# Patient Record
Sex: MALE | Race: WHITE | NOT HISPANIC OR LATINO | Employment: FULL TIME | URBAN - NONMETROPOLITAN AREA
[De-identification: names, ages, dates, MRNs, and addresses within clinical notes are randomized per-mention and may not be internally consistent; named-entity substitution may affect disease eponyms.]

---

## 2017-07-13 ENCOUNTER — HISTORY (OUTPATIENT)
Dept: FAMILY MEDICINE | Facility: OTHER | Age: 57
End: 2017-07-13

## 2017-07-13 ENCOUNTER — OFFICE VISIT - GICH (OUTPATIENT)
Dept: FAMILY MEDICINE | Facility: OTHER | Age: 57
End: 2017-07-13

## 2017-07-13 DIAGNOSIS — F52.8 OTHER SEXUAL DYSFUNCTION NOT DUE TO A SUBSTANCE OR KNOWN PHYSIOLOGICAL CONDITION: ICD-10-CM

## 2017-07-13 DIAGNOSIS — Z00.00 ENCOUNTER FOR GENERAL ADULT MEDICAL EXAMINATION WITHOUT ABNORMAL FINDINGS: ICD-10-CM

## 2017-07-13 DIAGNOSIS — Z98.890 OTHER SPECIFIED POSTPROCEDURAL STATES: ICD-10-CM

## 2017-07-13 DIAGNOSIS — F17.200 NICOTINE DEPENDENCE, UNCOMPLICATED: ICD-10-CM

## 2017-07-13 DIAGNOSIS — F41.1 GENERALIZED ANXIETY DISORDER: ICD-10-CM

## 2017-07-13 ASSESSMENT — ANXIETY QUESTIONNAIRES
6. BECOMING EASILY ANNOYED OR IRRITABLE: NOT AT ALL
3. WORRYING TOO MUCH ABOUT DIFFERENT THINGS: NOT AT ALL
GAD7 TOTAL SCORE: 0
5. BEING SO RESTLESS THAT IT IS HARD TO SIT STILL: NOT AT ALL
2. NOT BEING ABLE TO STOP OR CONTROL WORRYING: NOT AT ALL
4. TROUBLE RELAXING: NOT AT ALL
7. FEELING AFRAID AS IF SOMETHING AWFUL MIGHT HAPPEN: NOT AT ALL
1. FEELING NERVOUS, ANXIOUS, OR ON EDGE: NOT AT ALL

## 2017-07-13 ASSESSMENT — PATIENT HEALTH QUESTIONNAIRE - PHQ9: SUM OF ALL RESPONSES TO PHQ QUESTIONS 1-9: 0

## 2017-12-27 NOTE — PROGRESS NOTES
"Patient Information     Patient Name MRN Sex Td Jeronimo [id number] Male 1960      Progress Notes by David Carballo MD at 2017  3:00 PM     Author:  David Carballo MD Service:  (none) Author Type:  Physician     Filed:  2017  3:47 PM Encounter Date:  2017 Status:  Signed     :  David Carballo MD (Physician)            SUBJECTIVE:    Td Leonardo is a 56 y.o. male who presents for physical    HPI Comments: Patient arrives here for physical and medication refill. Currently does not have any complaints or concerns. States the Paxil is working well. Recently increased the dose.      No Known Allergies,   Family History       Problem   Relation Age of Onset     Psychiatric illness  Father      Dementia       Good Health  Mother    ,   No current outpatient prescriptions on file prior to visit.     No current facility-administered medications on file prior to visit.    ,   Past Surgical History:      Procedure  Laterality Date     TONSILLECTOMY       VASECTOMY     ,   Social History       Substance Use Topics         Smoking status:   Current Every Day Smoker     Smokeless tobacco:   Never Used     Alcohol use   Yes      Comment: less than weekly      and   Social History       Substance Use Topics         Smoking status:   Current Every Day Smoker     Smokeless tobacco:   Never Used     Alcohol use   Yes      Comment: less than weekly         REVIEW OF SYSTEMS:  ROS    OBJECTIVE:  /66  Pulse 72  Ht 1.676 m (5' 6\")  Wt 71.8 kg (158 lb 6.4 oz)  BMI 25.57 kg/m2    EXAM:   Physical Exam   Constitutional: He is oriented to person, place, and time and well-developed, well-nourished, and in no distress.   HENT:   Right Ear: External ear normal.   Left Ear: External ear normal.   Mouth/Throat: No oropharyngeal exudate.   Poor dentition   Cardiovascular: Normal rate, regular rhythm and normal heart sounds.    Pulmonary/Chest: Effort normal and breath sounds normal. "   Abdominal: Soft.   Genitourinary:   Genitourinary Comments: Declines prostate exam   Musculoskeletal: Normal range of motion.   Neurological: He is alert and oriented to person, place, and time.   Psychiatric: Affect normal.       ASSESSMENT/PLAN:    ICD-10-CM    1. Physical exam Z00.00 LIPID PANEL   2. ERECTILE DYSFUNCTION F52.8 tadalafil (CIALIS) 20 mg tablet      sildenafil (REVATIO) 20 mg tablet   3. H/O colonoscopy 2014 nl Z98.890    4. ANXIETY F41.1 PARoxetine (PAXIL) 30 mg tablet   5. TOBACCO USE F17.200         Plan:  The natural history of prostate cancer and ongoing controversy regarding screening and potential treatment outcomes of prostate cancer has been discussed with the patient. The meaning of a false positive PSA and a false negative PSA has been discussed. He indicates understanding of the limitations of this screening test and wishes not to proceed with screening PSA testing.  We'll proceed with a fasting lipid panel.  Also discussed trying Revatio. He buys his own impotence medication. If he would like to go back on Cialis will prescribe that.  Encouraged tobacco sensation.  Up-to-date on his colonoscopy.

## 2017-12-30 NOTE — NURSING NOTE
Patient Information     Patient Name MRN Td Bland [id number] Male 1960      Nursing Note by Maricel Giang at 2017  3:00 PM     Author:  Maricel Giang Service:  (none) Author Type:  (none)     Filed:  2017  3:08 PM Encounter Date:  2017 Status:  Signed     :  Maricel Giang            Patient here for yearly physical, medication refills. Last dental exam 1 year ago and last eye exam 3 months prior. No concerns today. Maricel Giang LPN .......................2017  2:54 PM

## 2018-01-26 VITALS
WEIGHT: 158.4 LBS | BODY MASS INDEX: 25.46 KG/M2 | DIASTOLIC BLOOD PRESSURE: 66 MMHG | HEART RATE: 72 BPM | SYSTOLIC BLOOD PRESSURE: 110 MMHG | HEIGHT: 66 IN

## 2018-01-30 ENCOUNTER — DOCUMENTATION ONLY (OUTPATIENT)
Dept: FAMILY MEDICINE | Facility: OTHER | Age: 58
End: 2018-01-30

## 2018-01-30 PROBLEM — Z00.00 PHYSICAL EXAM: Status: ACTIVE | Noted: 2017-07-13

## 2018-01-30 PROBLEM — F52.8 PSYCHOSEXUAL DYSFUNCTION WITH INHIBITED SEXUAL EXCITEMENT: Status: ACTIVE | Noted: 2018-01-30

## 2018-01-30 PROBLEM — Z98.890 H/O COLONOSCOPY: Status: ACTIVE | Noted: 2017-07-13

## 2018-01-30 PROBLEM — F41.1 ANXIETY STATE: Status: ACTIVE | Noted: 2018-01-30

## 2018-01-30 RX ORDER — SILDENAFIL CITRATE 20 MG/1
TABLET ORAL
COMMUNITY
Start: 2017-07-13 | End: 2021-03-03

## 2018-01-30 RX ORDER — PAROXETINE 30 MG/1
30 TABLET, FILM COATED ORAL EVERY MORNING
COMMUNITY
Start: 2017-07-13 | End: 2018-07-05

## 2018-01-30 RX ORDER — TADALAFIL 20 MG/1
TABLET ORAL
COMMUNITY
Start: 2017-07-13 | End: 2019-01-24

## 2018-02-03 ASSESSMENT — PATIENT HEALTH QUESTIONNAIRE - PHQ9: SUM OF ALL RESPONSES TO PHQ QUESTIONS 1-9: 0

## 2018-02-03 ASSESSMENT — ANXIETY QUESTIONNAIRES: GAD7 TOTAL SCORE: 0

## 2018-07-05 DIAGNOSIS — F41.1 ANXIETY STATE: Primary | ICD-10-CM

## 2018-07-05 NOTE — LETTER
July 6, 2018      Td Leonardo  [address] [place] [address]        Dear Td,     This letter is to remind you that you are due for your annual exam with David Carballo. Your last comprehensive visit was more than 12 months ago.    A LIMITED refill of Paroxetine (Paxil) has been called into your pharmacy. Additional refills require you to complete this appointment.    Please call the clinic at [phone] to schedule your appointment.    If you should require additional refills before your scheduled appointment, please contact your pharmacy and we will refill your medication until the date of your appointment.    If you are no longer seeing David Carballo for primary care, please call to let us know. Doing so will remove you from our call/contact list.      Thank you for choosing [facility] and [facility] for your health care needs.    Sincerely,    Refill RN  [facility]

## 2018-07-06 RX ORDER — PAROXETINE 30 MG/1
30 TABLET, FILM COATED ORAL EVERY MORNING
Qty: 90 TABLET | Refills: 0 | Status: SHIPPED | OUTPATIENT
Start: 2018-07-06 | End: 2019-01-24

## 2018-07-06 NOTE — TELEPHONE ENCOUNTER
Last OV 7/13/17 with PCP. Prescription approved per [facility] Refill Protocol. Refill authorized for 90 days and 0 refill at this time.     Patient is due for his annual.  Reminder letter sent.      Radha Ramos RN on 7/6/2018 at 3:46 PM

## 2019-01-24 ENCOUNTER — OFFICE VISIT (OUTPATIENT)
Dept: FAMILY MEDICINE | Facility: OTHER | Age: 59
End: 2019-01-24
Attending: FAMILY MEDICINE
Payer: COMMERCIAL

## 2019-01-24 ENCOUNTER — MEDICAL CORRESPONDENCE (OUTPATIENT)
Dept: HEALTH INFORMATION MANAGEMENT | Facility: OTHER | Age: 59
End: 2019-01-24

## 2019-01-24 VITALS
HEART RATE: 68 BPM | RESPIRATION RATE: 20 BRPM | BODY MASS INDEX: 24.34 KG/M2 | DIASTOLIC BLOOD PRESSURE: 64 MMHG | TEMPERATURE: 98.3 F | SYSTOLIC BLOOD PRESSURE: 122 MMHG | WEIGHT: 150.8 LBS

## 2019-01-24 DIAGNOSIS — F41.1 ANXIETY STATE: ICD-10-CM

## 2019-01-24 DIAGNOSIS — F17.200 TOBACCO USE DISORDER: Primary | ICD-10-CM

## 2019-01-24 DIAGNOSIS — N52.9 IMPOTENCE OF ORGANIC ORIGIN: ICD-10-CM

## 2019-01-24 PROCEDURE — 99213 OFFICE O/P EST LOW 20 MIN: CPT | Performed by: FAMILY MEDICINE

## 2019-01-24 RX ORDER — TADALAFIL 20 MG/1
20 TABLET ORAL DAILY PRN
Qty: 30 TABLET | Refills: 5 | Status: SHIPPED | OUTPATIENT
Start: 2019-01-24 | End: 2020-02-07

## 2019-01-24 RX ORDER — SILDENAFIL CITRATE 20 MG/1
TABLET ORAL
Status: CANCELLED | OUTPATIENT
Start: 2019-01-24

## 2019-01-24 RX ORDER — VARENICLINE TARTRATE 1 MG/1
1 TABLET, FILM COATED ORAL 2 TIMES DAILY
Qty: 180 TABLET | Refills: 3 | Status: SHIPPED | OUTPATIENT
Start: 2019-01-24 | End: 2020-01-24

## 2019-01-24 RX ORDER — PAROXETINE 30 MG/1
30 TABLET, FILM COATED ORAL EVERY MORNING
Qty: 90 TABLET | Refills: 0 | Status: SHIPPED | OUTPATIENT
Start: 2019-01-24 | End: 2019-06-10

## 2019-01-24 ASSESSMENT — ANXIETY QUESTIONNAIRES
6. BECOMING EASILY ANNOYED OR IRRITABLE: NOT AT ALL
2. NOT BEING ABLE TO STOP OR CONTROL WORRYING: NOT AT ALL
GAD7 TOTAL SCORE: 1
5. BEING SO RESTLESS THAT IT IS HARD TO SIT STILL: NOT AT ALL
3. WORRYING TOO MUCH ABOUT DIFFERENT THINGS: NOT AT ALL
7. FEELING AFRAID AS IF SOMETHING AWFUL MIGHT HAPPEN: NOT AT ALL
1. FEELING NERVOUS, ANXIOUS, OR ON EDGE: SEVERAL DAYS
IF YOU CHECKED OFF ANY PROBLEMS ON THIS QUESTIONNAIRE, HOW DIFFICULT HAVE THESE PROBLEMS MADE IT FOR YOU TO DO YOUR WORK, TAKE CARE OF THINGS AT HOME, OR GET ALONG WITH OTHER PEOPLE: NOT DIFFICULT AT ALL

## 2019-01-24 ASSESSMENT — PATIENT HEALTH QUESTIONNAIRE - PHQ9
5. POOR APPETITE OR OVEREATING: NOT AT ALL
SUM OF ALL RESPONSES TO PHQ QUESTIONS 1-9: 0

## 2019-01-24 ASSESSMENT — PAIN SCALES - GENERAL: PAINLEVEL: NO PAIN (0)

## 2019-01-24 NOTE — NURSING NOTE
"Chief Complaint   Patient presents with     Recheck Medication       Initial /64 (BP Location: Right arm, Patient Position: Sitting, Cuff Size: Adult Regular)   Pulse 68   Temp 98.3  F (36.8  C) (Tympanic)   Resp 20   Wt 68.4 kg (150 lb 12.8 oz)   BMI 24.34 kg/m   Estimated body mass index is 24.34 kg/m  as calculated from the following:    Height as of 7/13/17: 1.676 m (5' 6\").    Weight as of this encounter: 68.4 kg (150 lb 12.8 oz).  Medication Reconciliation: Completed     Cynthia Zaidi LPN  "

## 2019-01-25 ASSESSMENT — ANXIETY QUESTIONNAIRES: GAD7 TOTAL SCORE: 1

## 2019-01-25 NOTE — PROGRESS NOTES
SUBJECTIVE:   Td Leonardo is a 58 year old male who presents to clinic today for the following health issues: Medication follow-up    Patient arrives here for medication follow-up also requesting prescription for Chantix.  He is attempting to quit smoking again.  Medication is working well.          Patient Active Problem List    Diagnosis Date Noted     Impotence of organic origin 01/24/2019     Priority: Medium     Anxiety state 01/30/2018     Priority: Medium     Psychosexual dysfunction with inhibited sexual excitement 01/30/2018     Priority: Medium     Overview:   Impotence       H/O colonoscopy 07/13/2017     Priority: Medium     Physical exam 07/13/2017     Priority: Medium     Tobacco use disorder 01/25/2012     Priority: Medium     Backache 06/16/2011     Priority: Medium     Past Medical History:   Diagnosis Date     Strain of muscle, fascia and tendon of lower back, initial encounter     No Comments Provided      Past Surgical History:   Procedure Laterality Date     TONSILLECTOMY      No Comments Provided     VASECTOMY      2004       Review of Systems     OBJECTIVE:     /64 (BP Location: Right arm, Patient Position: Sitting, Cuff Size: Adult Regular)   Pulse 68   Temp 98.3  F (36.8  C) (Tympanic)   Resp 20   Wt 68.4 kg (150 lb 12.8 oz)   BMI 24.34 kg/m    Body mass index is 24.34 kg/m .  Physical Exam   Constitutional: He appears well-developed.   Poor teeth condition   Eyes: Pupils are equal, round, and reactive to light.   Pulmonary/Chest: Effort normal.   Neurological: He is alert.           ASSESSMENT/PLAN:         1. Anxiety state  States that the Paxil is working well.  - PARoxetine (PAXIL) 30 MG tablet; Take 1 tablet (30 mg) by mouth every morning  Dispense: 90 tablet; Refill: 0    2. Tobacco use disorder  Start.  Discussed hallucinations.  - varenicline (CHANTIX CHAVA) 0.5 MG X 11 & 1 MG X 42 tablet; Take 0.5 mg tab daily for 3 days, THEN 0.5 mg tab twice daily for 4 days, THEN 1  mg twice daily.  Dispense: 53 tablet; Refill: 0  - varenicline (CHANTIX) 1 MG tablet; Take 1 tablet (1 mg) by mouth 2 times daily  Dispense: 180 tablet; Refill: 3    3. Impotence of organic origin  Refill  - tadalafil (CIALIS) 20 MG tablet; Take 1 tablet (20 mg) by mouth daily as needed  Dispense: 30 tablet; Refill: 5  Patient has not had colonoscopy screening and refuses colonoscopy screening.  He is agreeable to proceed with [place] guard    Also discussed screening tests including PSA  BMP for diabetes.  Patient declines proceeding with any testing.      David Carballo MD  [facility] AND [facility]

## 2019-06-10 DIAGNOSIS — F41.1 ANXIETY STATE: ICD-10-CM

## 2019-06-10 RX ORDER — PAROXETINE 30 MG/1
30 TABLET, FILM COATED ORAL EVERY MORNING
Qty: 90 TABLET | Refills: 3 | Status: SHIPPED | OUTPATIENT
Start: 2019-06-10 | End: 2020-03-23

## 2019-06-10 NOTE — TELEPHONE ENCOUNTER
"Requested Prescriptions   Pending Prescriptions Disp Refills     PARoxetine (PAXIL) 30 MG tablet 90 tablet 0     Sig: Take 1 tablet (30 mg) by mouth every morning       SSRIs Protocol Passed - 6/10/2019  3:34 PM        Passed - Recent (12 mo) or future (30 days) visit within the authorizing provider's specialty     Patient had office visit in the last 12 months or has a visit in the next 30 days with authorizing provider or within the authorizing provider's specialty.  See \"Patient Info\" tab in inbasket, or \"Choose Columns\" in Meds & Orders section of the refill encounter.              Passed - Medication is active on med list        Passed - Patient is age 18 or older        Pt last OV Jan 2019 and no changes made at this visit with his Paxil. Prescription approved per [facility] Refill Protocol.  Miladys Farnsworth RN on 6/10/2019 at 3:44 PM   "

## 2020-02-06 DIAGNOSIS — N52.9 IMPOTENCE OF ORGANIC ORIGIN: ICD-10-CM

## 2020-02-07 RX ORDER — TADALAFIL 20 MG/1
20 TABLET ORAL DAILY PRN
Qty: 30 TABLET | Refills: 5 | Status: SHIPPED | OUTPATIENT
Start: 2020-02-07 | End: 2021-03-03

## 2020-03-22 DIAGNOSIS — F41.1 ANXIETY STATE: ICD-10-CM

## 2020-03-22 NOTE — LETTER
March 23, 2020      Td Leonardo  [address] [place] [address]        Dear Td,     This letter is to remind you that you are due for your annual exam with David Carballo. Your last comprehensive visit was more than 12 months ago.    Your refill request has been sent to your provider for further review. Additional refills require you to complete this appointment.    Please call the clinic at [phone] to schedule your appointment.    If you should require additional refills before your scheduled appointment, please contact your pharmacy and we will refill your medication until the date of your appointment.    If you are no longer seeing David Carballo for primary care, please call to let us know. Doing so will remove you from our call/contact list.      Thank you for choosing [facility] and [facility] for your health care needs.    Sincerely,    Refill RN  [facility]

## 2020-03-23 RX ORDER — PAROXETINE 30 MG/1
30 TABLET, FILM COATED ORAL EVERY MORNING
Qty: 90 TABLET | Refills: 0 | Status: SHIPPED | OUTPATIENT
Start: 2020-03-23 | End: 2020-10-26

## 2020-03-23 NOTE — TELEPHONE ENCOUNTER
"[id number] sent Rx request for the following:      PAROXETINE 30MG TABLET   Sig: Take 1 tablet (30 mg) by mouth every morning       Last Prescription Date:   6/10/2019  Last Fill Qty/Refills:         90, R-3    Last Office Visit:              1/24/2019   Future Office visit:           none    Reminder letter sent to patient to schedule annual physical with PCP.     Requested Prescriptions   Pending Prescriptions Disp Refills     PARoxetine (PAXIL) 30 MG tablet [Pharmacy Med Name: PAROXETINE 30MG TABLET] 90 tablet 3     Sig: TAKE 1 TABLET (30 MG) BY MOUTH EVERY MORNING       SSRIs Protocol Failed - 3/22/2020 11:06 AM        Failed - Recent (12 mo) or future (30 days) visit within the authorizing provider's specialty     Patient has had an office visit with the authorizing provider or a provider within the authorizing providers department within the previous 12 mos or has a future within next 30 days. See \"Patient Info\" tab in inbasket, or \"Choose Columns\" in Meds & Orders section of the refill encounter.              Passed - Medication is active on med list        Passed - Patient is age 18 or older           Unable to complete prescription refill per RN Medication Refill Policy.................... Sanjay Moody RN ....................  3/23/2020   11:19 AM              "

## 2020-10-23 DIAGNOSIS — F41.1 ANXIETY STATE: Primary | ICD-10-CM

## 2020-10-23 NOTE — TELEPHONE ENCOUNTER
[id number] ([facility] of [place] sent Rx request for the following:      Requested Prescriptions   Pending Prescriptions Disp Refills   PARoxetine (PAXIL) 30 MG tablet [Pharmacy Med Name: PAROXETINE 30MG TABLET] 90 tablet 0    Sig: TAKE 1 TABLET (30 MG) BY MOUTH EVERY MORNING   Last Prescription Date:   3/23/20  Last Fill Qty/Refills:         90, R-0    Last Office Visit:              1/24/19  Future Office visit:           None  Routing refill request to provider for review/approval because:   SSRIs Protocol Failed - 10/23/2020  4:54 PM       Failed - Recent (12 mo) or future (30 days) visit within the authorizing provider's specialty   Break in medication    Patient is overdue for annual exam. Reminder letter sent to Pt in March. Patient failed to schedule appointment.     Called and spoke to Patient after verifying last name and date of birth. Pt reminded that is overdue for annual exam. Pt declined appointment (OV and VV) at this time, due to COVID. Pt request refill at this time. Routing to PCP, for refill consideration. Unable to complete prescription refill per RN Medication Refill Policy. Karly Riley RN .............. 10/26/2020  10:05 AM

## 2020-10-26 ENCOUNTER — ALLIED HEALTH/NURSE VISIT (OUTPATIENT)
Dept: FAMILY MEDICINE | Facility: OTHER | Age: 60
End: 2020-10-26
Attending: FAMILY MEDICINE
Payer: COMMERCIAL

## 2020-10-26 DIAGNOSIS — R68.83 CHILLS: Primary | ICD-10-CM

## 2020-10-26 PROCEDURE — C9803 HOPD COVID-19 SPEC COLLECT: HCPCS

## 2020-10-26 PROCEDURE — U0003 INFECTIOUS AGENT DETECTION BY NUCLEIC ACID (DNA OR RNA); SEVERE ACUTE RESPIRATORY SYNDROME CORONAVIRUS 2 (SARS-COV-2) (CORONAVIRUS DISEASE [COVID-19]), AMPLIFIED PROBE TECHNIQUE, MAKING USE OF HIGH THROUGHPUT TECHNOLOGIES AS DESCRIBED BY CMS-2020-01-R: HCPCS | Mod: ZL | Performed by: FAMILY MEDICINE

## 2020-10-26 PROCEDURE — 99207 PR NO CHARGE NURSE ONLY: CPT

## 2020-10-26 RX ORDER — PAROXETINE 30 MG/1
30 TABLET, FILM COATED ORAL EVERY MORNING
Qty: 90 TABLET | Refills: 0 | Status: SHIPPED | OUTPATIENT
Start: 2020-10-26 | End: 2021-02-04

## 2020-10-26 NOTE — PROGRESS NOTES
Patient swabbed for COVID-19 testing.  For upper respiratory, fatigue and chills.  Chaya Herrera LPN on 10/26/2020 at 12:01 PM

## 2020-10-27 LAB
SARS-COV-2 RNA SPEC QL NAA+PROBE: NOT DETECTED
SPECIMEN SOURCE: NORMAL

## 2020-10-31 ENCOUNTER — OFFICE VISIT (OUTPATIENT)
Dept: FAMILY MEDICINE | Facility: OTHER | Age: 60
End: 2020-10-31
Attending: NURSE PRACTITIONER
Payer: COMMERCIAL

## 2020-10-31 VITALS
SYSTOLIC BLOOD PRESSURE: 118 MMHG | HEART RATE: 67 BPM | DIASTOLIC BLOOD PRESSURE: 82 MMHG | OXYGEN SATURATION: 96 % | RESPIRATION RATE: 12 BRPM

## 2020-10-31 DIAGNOSIS — J02.9 SORE THROAT: ICD-10-CM

## 2020-10-31 DIAGNOSIS — R53.83 FATIGUE, UNSPECIFIED TYPE: ICD-10-CM

## 2020-10-31 DIAGNOSIS — J32.0 LEFT MAXILLARY SINUSITIS: Primary | ICD-10-CM

## 2020-10-31 PROCEDURE — U0003 INFECTIOUS AGENT DETECTION BY NUCLEIC ACID (DNA OR RNA); SEVERE ACUTE RESPIRATORY SYNDROME CORONAVIRUS 2 (SARS-COV-2) (CORONAVIRUS DISEASE [COVID-19]), AMPLIFIED PROBE TECHNIQUE, MAKING USE OF HIGH THROUGHPUT TECHNOLOGIES AS DESCRIBED BY CMS-2020-01-R: HCPCS | Mod: ZL | Performed by: NURSE PRACTITIONER

## 2020-10-31 PROCEDURE — C9803 HOPD COVID-19 SPEC COLLECT: HCPCS

## 2020-10-31 PROCEDURE — 99214 OFFICE O/P EST MOD 30 MIN: CPT | Performed by: NURSE PRACTITIONER

## 2020-10-31 ASSESSMENT — PAIN SCALES - GENERAL: PAINLEVEL: NO PAIN (0)

## 2020-10-31 NOTE — PROGRESS NOTES
HPI:    Td Leonardo is a 59 year old male  who presents to [facility] today for sinus.    Started with sore throat, low grade fever, chills, fatigue, body aches, and pressure in left cheek that started 7 days ago.  Negative covid testing on 10/26/2020 at day one of symptoms.   Ears have felt plugged for the past week, no ear pain or ringing.  Post nasal drainage of yellow color.  Stuffy nose with yellow phlegm.  No cough.  No chest tightness or heaviness.  No shortness of breath.  No chest pain.  No pain or swelling in lower extremities.  Body aches resolved.  Sore throat continues but is intermittent.  Left side of neck glands feel swollen for the past week.  Left cheek with swelling starting today.  Appetite at baseline.  No nausea, vomiting or diarrhea.  Partial loss of smell over the past week.   No loss of taste.  [place] fatigued.    Taking [place]uda [place] cold and Tylenol 650 mg.       Past Medical History:   Diagnosis Date     Strain of muscle, fascia and tendon of lower back, initial encounter     No Comments Provided     Past Surgical History:   Procedure Laterality Date     TONSILLECTOMY      No Comments Provided     VASECTOMY      2004     Social History     Tobacco Use     Smoking status: Current Every Day Smoker     Packs/day: 1.00     Smokeless tobacco: Never Used   Substance Use Topics     Alcohol use: Yes     Comment: Alcoholic Drinks/day: less than weekly     Current Outpatient Medications   Medication Sig Dispense Refill     PARoxetine (PAXIL) 30 MG tablet TAKE 1 TABLET (30 MG) BY MOUTH EVERY MORNING 90 tablet 0     tadalafil (CIALIS) 20 MG tablet TAKE 1 TABLET (20 MG) BY MOUTH DAILY AS NEEDED 30 tablet 5     sildenafil (REVATIO) 20 MG tablet Take 3 to 4 tablet prior to sexual activity       varenicline (CHANTIX CHAVA) 0.5 MG X 11 & 1 MG X 42 tablet Take 0.5 mg tab daily for 3 days, THEN 0.5 mg tab twice daily for 4 days, THEN 1 mg twice daily. (Patient not taking: Reported on 10/31/2020) 53  tablet 0     No Known Allergies      Past medical history, past surgical history, current medications and allergies reviewed and accurate to the best of my knowledge.        ROS:  Refer to HPI    /82 (BP Location: Right arm, Patient Position: Sitting, Cuff Size: Adult Large)   Pulse 67   Resp 12   SpO2 96%     EXAM:  General Appearance: miserable appearing but non toxic middle aged male, appropriate appearance for age. No acute distress  Ears: Left TM intact, translucent with bony landmarks appreciated, no erythema, no effusion, no bulging, no purulence.  Right TM intact, translucent with bony landmarks appreciated, no erythema, no effusion, no bulging, no purulence.  Left auditory canal clear.  Right auditory canal clear.  Normal external ears, non tender.  Eyes: conjunctivae normal without erythema or irritation, corneas clear, no drainage or crusting, no eyelid swelling, pupils equal   Orophayrnx: moist mucous membranes, posterior pharynx with  mild erythema, tonsils surgically absent, no hard petechiae, no post nasal drip seen, no trismus, voice clear.  No parotid area swelling or tenderness.  Sinuses: Significant left maxillary sinus tenderness upon palpation with visible swelling.  No right maxillary or bilateral frontal sinus tenderness.   Nose:  Bilateral nares: Erythematous, mild edema, no noted drainage or congestion   Neck: Bilateral tonsillar lymph node enlargement with tenderness on palpation.  No palpable or tender cervical lymph nodes.  Respiratory: normal chest wall and respirations.  Normal effort.  Clear to auscultation bilaterally, no wheezing, crackles or rhonchi.  No increased work of breathing.  No cough appreciated.  Cardiac: RRR with no murmurs  Musculoskeletal:  Equal movement of bilateral upper extremities.  Equal movement of bilateral lower extremities.  Normal gait.    Dermatological:  left facial cheek with visible swelling but no erythema, bruising, or rash  Psychological:  normal affect, alert, oriented, and pleasant.       Labs:  Covid test pending            ASSESSMENT/PLAN:  1. Left maxillary sinusitis    - amoxicillin-clavulanate (AUGMENTIN) 875-125 MG tablet; Take 1 tablet by mouth 2 times daily for 7 days  Dispense: 14 tablet; Refill: 0    Symptomatic treatment - elevation, humidifier, sinus rinse/netti pot, saline nasal spray, etc     May use over-the-counter Tylenol or ibuprofen PRN    Discussed warning signs/symptoms indicative of need to f/u  Follow up if symptoms persist or worsen or concerns    2. Sore throat    - Symptomatic COVID-19 Virus (Coronavirus) by PCR; Future  - Symptomatic COVID-19 Virus (Coronavirus) by PCR    Symptomatic treatment - encouraged fluids, salt water gargles, honey,lozenges, etc.    3. Fatigue, unspecified type    - Symptomatic COVID-19 Virus (Coronavirus) by PCR; Future  - Symptomatic COVID-19 Virus (Coronavirus) by PCR      Discussed with patient the recommendation to repeat the Covid test today as his symptoms are consistent with Covid and he was tested quite early in the development of his symptoms after only 1 day and that was 6 days ago.  Patient is agreeable with this plan.          I explained my diagnostic considerations and recommendations to the patient, who voiced understanding and agreement with the treatment plan. All questions were answered. We discussed potential side effects of any prescribed or recommended therapies, as well as expectations for response to treatments.    Disclaimer:  This note consists of words and symbols derived from keyboarding, dictation, or using voice recognition software. As a result, there may be errors in the script that have gone undetected. Please consider this when interpreting information found in this note.

## 2020-10-31 NOTE — NURSING NOTE
Patient is here for slightly swollen face on his left side, states he has not felt very good and has felt fatigued for the last week. Pt had a negative COVID test on Monday.       Medication Reconciliation: farheen Vanessa LPN  10/31/2020 3:22 PM

## 2020-11-02 LAB
SARS-COV-2 RNA SPEC QL NAA+PROBE: NOT DETECTED
SPECIMEN SOURCE: NORMAL

## 2020-12-27 ENCOUNTER — HEALTH MAINTENANCE LETTER (OUTPATIENT)
Age: 60
End: 2020-12-27

## 2021-01-07 NOTE — TELEPHONE ENCOUNTER
12/15/2020:  ed re-start gtts OD at [facility] and taper WITH OS (pt tapered gtts DAILY and has been off gtts OD for 3 days now! ).  ed may need thus to buy an extra bottle of PMN gtts to finish schedule. LOV - 7/13/17 with David Carballo

## 2021-02-04 DIAGNOSIS — F41.1 ANXIETY STATE: Primary | ICD-10-CM

## 2021-02-04 RX ORDER — PAROXETINE 30 MG/1
30 TABLET, FILM COATED ORAL EVERY MORNING
Qty: 30 TABLET | Refills: 0 | Status: SHIPPED | OUTPATIENT
Start: 2021-02-04 | End: 2021-03-03

## 2021-02-04 NOTE — TELEPHONE ENCOUNTER
[id number] ([facility] of [place] sent Rx request for the following:      Requested Prescriptions   Pending Prescriptions Disp Refills   PARoxetine (PAXIL) 30 MG tablet [Pharmacy Med Name: PAROXETINE 30MG TABLET] 90 tablet 0    Sig: TAKE 1 TABLET (30 MG) BY MOUTH EVERY MORNING   Last Prescription Date:   10/26/20  Last Fill Qty/Refills:         90, R-0    Last Office Visit:              1/24/19  Future Office visit:           None    Patient is >2 years overdue for annual exam. Reminder letter sent to Pt in March 2020. Per last refill encounter, Pt declined appointment, due to COVID. Dr. Carballo approved 90-day quinn refill. Called and spoke to Patient after verifying last name and date of birth. Pt reminded of this information, and he verbalized plan to call back and schedule appointment. Prescription approved per [facility] Refill Protocol for #30 days at this time, as Pt is OUT OF MEDICATION. Pt informed that if he is not seen before running out, a visit will be required for further refills. The patient indicates understanding of these issues and agrees with the plan. Karly Riley RN .............. 2/4/2021  11:25 AM

## 2021-03-03 ENCOUNTER — OFFICE VISIT (OUTPATIENT)
Dept: FAMILY MEDICINE | Facility: OTHER | Age: 61
End: 2021-03-03
Attending: FAMILY MEDICINE
Payer: COMMERCIAL

## 2021-03-03 VITALS
BODY MASS INDEX: 25.37 KG/M2 | OXYGEN SATURATION: 97 % | WEIGHT: 157.2 LBS | HEART RATE: 68 BPM | DIASTOLIC BLOOD PRESSURE: 78 MMHG | TEMPERATURE: 97.8 F | RESPIRATION RATE: 16 BRPM | SYSTOLIC BLOOD PRESSURE: 134 MMHG

## 2021-03-03 DIAGNOSIS — N52.9 IMPOTENCE OF ORGANIC ORIGIN: ICD-10-CM

## 2021-03-03 DIAGNOSIS — F41.1 ANXIETY STATE: ICD-10-CM

## 2021-03-03 DIAGNOSIS — F17.200 TOBACCO USE DISORDER: ICD-10-CM

## 2021-03-03 LAB
ALBUMIN SERPL-MCNC: 4.3 G/DL (ref 3.5–5.7)
ALP SERPL-CCNC: 58 U/L (ref 34–104)
ALT SERPL W P-5'-P-CCNC: 19 U/L (ref 7–52)
ANION GAP SERPL CALCULATED.3IONS-SCNC: 11 MMOL/L (ref 3–14)
AST SERPL W P-5'-P-CCNC: 19 U/L (ref 13–39)
BASOPHILS # BLD AUTO: 0 10E9/L (ref 0–0.2)
BASOPHILS NFR BLD AUTO: 0.2 %
BILIRUB SERPL-MCNC: 0.5 MG/DL (ref 0.3–1)
BUN SERPL-MCNC: 15 MG/DL (ref 7–25)
CALCIUM SERPL-MCNC: 9.3 MG/DL (ref 8.6–10.3)
CHLORIDE SERPL-SCNC: 106 MMOL/L (ref 98–107)
CHOLEST SERPL-MCNC: 199 MG/DL
CO2 SERPL-SCNC: 21 MMOL/L (ref 21–31)
CREAT SERPL-MCNC: 0.68 MG/DL (ref 0.7–1.3)
DIFFERENTIAL METHOD BLD: ABNORMAL
EOSINOPHIL # BLD AUTO: 0.1 10E9/L (ref 0–0.7)
EOSINOPHIL NFR BLD AUTO: 1 %
ERYTHROCYTE [DISTWIDTH] IN BLOOD BY AUTOMATED COUNT: 13.2 % (ref 10–15)
GFR SERPL CREATININE-BSD FRML MDRD: >90 ML/MIN/{1.73_M2}
GLUCOSE SERPL-MCNC: 101 MG/DL (ref 70–105)
HCT VFR BLD AUTO: 43.6 % (ref 40–53)
HDLC SERPL-MCNC: 49 MG/DL (ref 23–92)
HGB BLD-MCNC: 15 G/DL (ref 13.3–17.7)
IMM GRANULOCYTES # BLD: 0 10E9/L (ref 0–0.4)
IMM GRANULOCYTES NFR BLD: 0.3 %
LDLC SERPL CALC-MCNC: 134 MG/DL
LYMPHOCYTES # BLD AUTO: 3.6 10E9/L (ref 0.8–5.3)
LYMPHOCYTES NFR BLD AUTO: 26.5 %
MCH RBC QN AUTO: 30.5 PG (ref 26.5–33)
MCHC RBC AUTO-ENTMCNC: 34.4 G/DL (ref 31.5–36.5)
MCV RBC AUTO: 89 FL (ref 78–100)
MONOCYTES # BLD AUTO: 0.9 10E9/L (ref 0–1.3)
MONOCYTES NFR BLD AUTO: 6.6 %
NEUTROPHILS # BLD AUTO: 8.8 10E9/L (ref 1.6–8.3)
NEUTROPHILS NFR BLD AUTO: 65.4 %
NONHDLC SERPL-MCNC: 150 MG/DL
PLATELET # BLD AUTO: 400 10E9/L (ref 150–450)
POTASSIUM SERPL-SCNC: 3.5 MMOL/L (ref 3.5–5.1)
PROT SERPL-MCNC: 7 G/DL (ref 6.4–8.9)
RBC # BLD AUTO: 4.91 10E12/L (ref 4.4–5.9)
SODIUM SERPL-SCNC: 138 MMOL/L (ref 134–144)
TRIGL SERPL-MCNC: 78 MG/DL
WBC # BLD AUTO: 13.5 10E9/L (ref 4–11)

## 2021-03-03 PROCEDURE — 80061 LIPID PANEL: CPT | Mod: ZL | Performed by: FAMILY MEDICINE

## 2021-03-03 PROCEDURE — 85025 COMPLETE CBC W/AUTO DIFF WBC: CPT | Mod: ZL | Performed by: FAMILY MEDICINE

## 2021-03-03 PROCEDURE — 80053 COMPREHEN METABOLIC PANEL: CPT | Mod: ZL | Performed by: FAMILY MEDICINE

## 2021-03-03 PROCEDURE — 99214 OFFICE O/P EST MOD 30 MIN: CPT | Performed by: FAMILY MEDICINE

## 2021-03-03 PROCEDURE — 36415 COLL VENOUS BLD VENIPUNCTURE: CPT | Mod: ZL | Performed by: FAMILY MEDICINE

## 2021-03-03 RX ORDER — TADALAFIL 20 MG/1
20 TABLET ORAL DAILY PRN
Qty: 30 TABLET | Refills: 5 | Status: SHIPPED | OUTPATIENT
Start: 2021-03-03 | End: 2022-12-04

## 2021-03-03 RX ORDER — VARENICLINE TARTRATE 1 MG/1
1 TABLET, FILM COATED ORAL 2 TIMES DAILY
Qty: 60 TABLET | Refills: 4 | Status: SHIPPED | OUTPATIENT
Start: 2021-03-03 | End: 2021-09-16

## 2021-03-03 RX ORDER — PAROXETINE 30 MG/1
30 TABLET, FILM COATED ORAL EVERY MORNING
Qty: 90 TABLET | Refills: 4 | Status: SHIPPED | OUTPATIENT
Start: 2021-03-03 | End: 2022-02-23

## 2021-03-03 ASSESSMENT — ANXIETY QUESTIONNAIRES
5. BEING SO RESTLESS THAT IT IS HARD TO SIT STILL: NOT AT ALL
GAD7 TOTAL SCORE: 0
2. NOT BEING ABLE TO STOP OR CONTROL WORRYING: NOT AT ALL
1. FEELING NERVOUS, ANXIOUS, OR ON EDGE: NOT AT ALL
7. FEELING AFRAID AS IF SOMETHING AWFUL MIGHT HAPPEN: NOT AT ALL
3. WORRYING TOO MUCH ABOUT DIFFERENT THINGS: NOT AT ALL
6. BECOMING EASILY ANNOYED OR IRRITABLE: NOT AT ALL

## 2021-03-03 ASSESSMENT — PATIENT HEALTH QUESTIONNAIRE - PHQ9
SUM OF ALL RESPONSES TO PHQ QUESTIONS 1-9: 0
5. POOR APPETITE OR OVEREATING: NOT AT ALL

## 2021-03-03 ASSESSMENT — PAIN SCALES - GENERAL: PAINLEVEL: NO PAIN (0)

## 2021-03-03 NOTE — PROGRESS NOTES
1  SUBJECTIVE:   Td Leonardo is a 60 year old male who presents to clinic today for the following health issues: Medication refill    Patient arrives here for medication refill.  Tolerating the medication well.  Is not requesting any adjustment.  Review of the chart shows that he has a history of tobacco abuse and is wanting to start Chantix again.  He is a candidate for CT scan screening and I did present this to him.  He is declined.  Also requesting a refill of Cialis.  Also he is in need of a tetanus.  When he was also presented to have a Covid today which he chooses Covid over tetanus.  Recently has been having back pain periodically 2-3 times a year.  Had one near a can and after this back pain has had some numbness to his right side.  No leg weakness.  Or other complaints.        Patient Active Problem List    Diagnosis Date Noted     Impotence of organic origin 01/24/2019     Priority: Medium     Anxiety state 01/30/2018     Priority: Medium     Psychosexual dysfunction with inhibited sexual excitement 01/30/2018     Priority: Medium     Overview:   Impotence       H/O colonoscopy 07/13/2017     Priority: Medium     Physical exam 07/13/2017     Priority: Medium     Tobacco use disorder 01/25/2012     Priority: Medium     Backache 06/16/2011     Priority: Medium     Past Medical History:   Diagnosis Date     Strain of muscle, fascia and tendon of lower back, initial encounter     No Comments Provided      Current Outpatient Medications   Medication Sig Dispense Refill     PARoxetine (PAXIL) 30 MG tablet Take 1 tablet (30 mg) by mouth every morning 90 tablet 4     tadalafil (CIALIS) 20 MG tablet Take 1 tablet (20 mg) by mouth daily as needed 30 tablet 5     varenicline (CHANTIX CHAVA) 0.5 MG X 11 & 1 MG X 42 tablet Take 0.5 mg tab daily for 3 days, THEN 0.5 mg tab twice daily for 4 days, THEN 1 mg twice daily. 53 tablet 0     varenicline (CHANTIX) 1 MG tablet Take 1 tablet (1 mg) by mouth 2 times daily 60  tablet 4     No Known Allergies    Review of Systems     OBJECTIVE:     /78   Pulse 68   Temp 97.8  F (36.6  C)   Resp 16   Wt 71.3 kg (157 lb 3.2 oz)   SpO2 97%   BMI 25.37 kg/m    Body mass index is 25.37 kg/m .  Physical Exam  Constitutional:       Appearance: Normal appearance.   HENT:      Head: Normocephalic.   Cardiovascular:      Rate and Rhythm: Normal rate and regular rhythm.   Pulmonary:      Effort: Pulmonary effort is normal.      Breath sounds: Normal breath sounds.   Neurological:      Mental Status: He is alert.   Psychiatric:         Mood and Affect: Mood normal.         Thought Content: Thought content normal.         Diagnostic Test Results:  Results for orders placed or performed in visit on 03/03/21 (from the past 24 hour(s))   CBC and Differential   Result Value Ref Range    WBC 13.5 (H) 4.0 - 11.0 10e9/L    RBC Count 4.91 4.4 - 5.9 10e12/L    Hemoglobin 15.0 13.3 - 17.7 g/dL    Hematocrit 43.6 40.0 - 53.0 %    MCV 89 78 - 100 fl    MCH 30.5 26.5 - 33.0 pg    MCHC 34.4 31.5 - 36.5 g/dL    RDW 13.2 10.0 - 15.0 %    Platelet Count 400 150 - 450 10e9/L    Diff Method Automated Method     % Neutrophils 65.4 %    % Lymphocytes 26.5 %    % Monocytes 6.6 %    % Eosinophils 1.0 %    % Basophils 0.2 %    % Immature Granulocytes 0.3 %    Absolute Neutrophil 8.8 (H) 1.6 - 8.3 10e9/L    Absolute Lymphocytes 3.6 0.8 - 5.3 10e9/L    Absolute Monocytes 0.9 0.0 - 1.3 10e9/L    Absolute Eosinophils 0.1 0.0 - 0.7 10e9/L    Absolute Basophils 0.0 0.0 - 0.2 10e9/L    Abs Immature Granulocytes 0.0 0 - 0.4 10e9/L       ASSESSMENT/PLAN:         1. Tobacco use disorder  Declined CT scan  Start the starter pack and then continuing pack not to take both together    - varenicline (CHANTIX CHAVA) 0.5 MG X 11 & 1 MG X 42 tablet; Take 0.5 mg tab daily for 3 days, THEN 0.5 mg tab twice daily for 4 days, THEN 1 mg twice daily.  Dispense: 53 tablet; Refill: 0  - varenicline (CHANTIX) 1 MG tablet; Take 1 tablet (1 mg)  by mouth 2 times daily  Dispense: 60 tablet; Refill: 4    2. Anxiety state  Refill  - PARoxetine (PAXIL) 30 MG tablet; Take 1 tablet (30 mg) by mouth every morning  Dispense: 90 tablet; Refill: 4  - Comprehensive Metabolic Panel; Future  - CBC and Differential; Future  - Lipid Panel; Future  - Lipid Panel  - CBC and Differential  - Comprehensive Metabolic Panel    3. Impotence of organic origin    Refill  - tadalafil (CIALIS) 20 MG tablet; Take 1 tablet (20 mg) by mouth daily as needed  Dispense: 30 tablet; Refill: 5      David Carballo MD  [facility] :[id number]}

## 2021-03-03 NOTE — NURSING NOTE
Patient here for medication review and yearly refills. Medication Reconciliation: complete.    Maricel Giang LPN  3/3/2021 3:50 PM

## 2021-03-04 DIAGNOSIS — Z23 ENCOUNTER FOR IMMUNIZATION: Primary | ICD-10-CM

## 2021-03-04 ASSESSMENT — ANXIETY QUESTIONNAIRES: GAD7 TOTAL SCORE: 0

## 2021-03-05 PROCEDURE — 91300 PR COVID VAC PFIZER DIL RECON 30 MCG/0.3 ML IM: CPT

## 2021-03-05 PROCEDURE — 0001A PR COVID VAC PFIZER DIL RECON 30 MCG/0.3 ML IM: CPT

## 2021-03-24 ENCOUNTER — IMMUNIZATION (OUTPATIENT)
Dept: FAMILY MEDICINE | Facility: OTHER | Age: 61
End: 2021-03-24
Attending: FAMILY MEDICINE
Payer: COMMERCIAL

## 2021-03-24 DIAGNOSIS — Z23 ENCOUNTER FOR IMMUNIZATION: ICD-10-CM

## 2021-03-24 PROCEDURE — 0002A PR COVID VAC PFIZER DIL RECON 30 MCG/0.3 ML IM: CPT

## 2021-03-24 PROCEDURE — 91300 PR COVID VAC PFIZER DIL RECON 30 MCG/0.3 ML IM: CPT

## 2021-09-16 ENCOUNTER — OFFICE VISIT (OUTPATIENT)
Dept: FAMILY MEDICINE | Facility: OTHER | Age: 61
End: 2021-09-16
Attending: FAMILY MEDICINE
Payer: COMMERCIAL

## 2021-09-16 ENCOUNTER — HOSPITAL ENCOUNTER (OUTPATIENT)
Dept: GENERAL RADIOLOGY | Facility: OTHER | Age: 61
End: 2021-09-16
Attending: FAMILY MEDICINE
Payer: COMMERCIAL

## 2021-09-16 VITALS
HEIGHT: 67 IN | TEMPERATURE: 97.4 F | OXYGEN SATURATION: 96 % | BODY MASS INDEX: 24.35 KG/M2 | WEIGHT: 155.13 LBS | DIASTOLIC BLOOD PRESSURE: 82 MMHG | SYSTOLIC BLOOD PRESSURE: 130 MMHG | HEART RATE: 69 BPM | RESPIRATION RATE: 18 BRPM

## 2021-09-16 DIAGNOSIS — M54.16 LUMBAR BACK PAIN WITH RADICULOPATHY AFFECTING RIGHT LOWER EXTREMITY: ICD-10-CM

## 2021-09-16 DIAGNOSIS — R29.898 WEAKNESS OF BOTH LOWER EXTREMITIES: ICD-10-CM

## 2021-09-16 DIAGNOSIS — R29.898 WEAKNESS OF BOTH LOWER EXTREMITIES: Primary | ICD-10-CM

## 2021-09-16 PROCEDURE — 99214 OFFICE O/P EST MOD 30 MIN: CPT | Performed by: FAMILY MEDICINE

## 2021-09-16 PROCEDURE — 72100 X-RAY EXAM L-S SPINE 2/3 VWS: CPT

## 2021-09-16 RX ORDER — GABAPENTIN 300 MG/1
300 CAPSULE ORAL 3 TIMES DAILY
Qty: 90 CAPSULE | Refills: 3 | Status: SHIPPED | OUTPATIENT
Start: 2021-09-16 | End: 2022-01-24

## 2021-09-16 ASSESSMENT — PAIN SCALES - GENERAL: PAINLEVEL: MILD PAIN (3)

## 2021-09-16 ASSESSMENT — MIFFLIN-ST. JEOR: SCORE: 1462.27

## 2021-09-16 NOTE — NURSING NOTE
Patient presents to clinic experiencing ongoing lower back pain which radiates down right leg.  Pain rated 3 and is worsen when standing.  Medication Reconciliation: complete    Karly White LPN

## 2021-09-17 NOTE — PROGRESS NOTES
"  SUBJECTIVE:   Td Leonardo is a 60 year old male who presents to clinic today for the following health issues: Legs giving out    Patient arrives here after sustaining a number of episodes of his legs going out.  States he will get a catch in his back.  He loses all strength in his legs and falls.  He also reports increasing pain going down his legs.  He states he also exhibits numbness going around his lower abdomen.  Patient reports that when it occurs it is very troublesome.  He reports the numbness in the leg pain are not acceptable.  He denies any injury.  He has had symptoms starting about 3 weeks ago.  Usually when they occur he feels a catch in his back.  Patient denies any incontinence of bowel or bladder.  He denies any fevers or chills.  Does have a history of smoking.  Review of the chart shows he is in need of a a CT scan screening for tobacco abuse.  I did bring this up and he declined        Patient Active Problem List    Diagnosis Date Noted     Impotence of organic origin 01/24/2019     Priority: Medium     Anxiety state 01/30/2018     Priority: Medium     Psychosexual dysfunction with inhibited sexual excitement 01/30/2018     Priority: Medium     Overview:   Impotence       H/O colonoscopy 07/13/2017     Priority: Medium     Physical exam 07/13/2017     Priority: Medium     Tobacco use disorder 01/25/2012     Priority: Medium     Backache 06/16/2011     Priority: Medium     Past Medical History:   Diagnosis Date     Strain of muscle, fascia and tendon of lower back, initial encounter     No Comments Provided        Review of Systems     OBJECTIVE:     /82 (BP Location: Right arm, Patient Position: Sitting, Cuff Size: Adult Regular)   Pulse 69   Temp 97.4  F (36.3  C) (Tympanic)   Resp 18   Ht 1.702 m (5' 7\")   Wt 70.4 kg (155 lb 2 oz)   SpO2 96%   BMI 24.30 kg/m    Body mass index is 24.3 kg/m .  Physical Exam  Constitutional:       Appearance: Normal appearance. "   Musculoskeletal:      Comments: Patient is able to walk on toes and heels.  Squats which is a little difficulty.  Deep tendon reflexes bilateral about 1+.  Negative straight leg raise.   Neurological:      Mental Status: He is alert.         Diagnostic Test Results:  none     ASSESSMENT/PLAN:         (R21.898) Weakness of both lower extremities  (primary encounter diagnosis)  X-rays do show significant amount of degenerative changes    (M54.16) Lumbar back pain with radiculopathy affecting right lower extremity  Comment:   Plan: gabapentin (NEURONTIN) 300 MG capsule  We will start        David Carballo MD  [facility]

## 2021-09-28 ENCOUNTER — HOSPITAL ENCOUNTER (OUTPATIENT)
Dept: MRI IMAGING | Facility: OTHER | Age: 61
Discharge: HOME | End: 2021-09-28
Attending: FAMILY MEDICINE | Admitting: FAMILY MEDICINE
Payer: COMMERCIAL

## 2021-09-28 DIAGNOSIS — R29.898 WEAKNESS OF BOTH LOWER EXTREMITIES: ICD-10-CM

## 2021-09-28 DIAGNOSIS — M54.16 LUMBAR BACK PAIN WITH RADICULOPATHY AFFECTING RIGHT LOWER EXTREMITY: ICD-10-CM

## 2021-09-28 PROCEDURE — 72148 MRI LUMBAR SPINE W/O DYE: CPT

## 2021-10-09 ENCOUNTER — HEALTH MAINTENANCE LETTER (OUTPATIENT)
Age: 61
End: 2021-10-09

## 2021-10-17 ENCOUNTER — ALLIED HEALTH/NURSE VISIT (OUTPATIENT)
Dept: FAMILY MEDICINE | Facility: OTHER | Age: 61
End: 2021-10-17
Attending: FAMILY MEDICINE
Payer: COMMERCIAL

## 2021-10-17 DIAGNOSIS — R43.2 LOSS OF TASTE: ICD-10-CM

## 2021-10-17 DIAGNOSIS — R43.9 UNSPECIFIED DISTURBANCES OF SMELL AND TASTE: Primary | ICD-10-CM

## 2021-10-17 DIAGNOSIS — R43.0 LOSS OF SMELL: ICD-10-CM

## 2021-10-17 PROCEDURE — C9803 HOPD COVID-19 SPEC COLLECT: HCPCS

## 2021-10-17 PROCEDURE — U0003 INFECTIOUS AGENT DETECTION BY NUCLEIC ACID (DNA OR RNA); SEVERE ACUTE RESPIRATORY SYNDROME CORONAVIRUS 2 (SARS-COV-2) (CORONAVIRUS DISEASE [COVID-19]), AMPLIFIED PROBE TECHNIQUE, MAKING USE OF HIGH THROUGHPUT TECHNOLOGIES AS DESCRIBED BY CMS-2020-01-R: HCPCS | Mod: ZL

## 2021-10-19 LAB — SARS-COV-2 RNA RESP QL NAA+PROBE: POSITIVE

## 2021-12-12 DIAGNOSIS — F41.1 ANXIETY STATE: ICD-10-CM

## 2021-12-15 RX ORDER — PAROXETINE 30 MG/1
30 TABLET, FILM COATED ORAL EVERY MORNING
Qty: 90 TABLET | Refills: 3 | OUTPATIENT
Start: 2021-12-15

## 2021-12-31 ENCOUNTER — ALLIED HEALTH/NURSE VISIT (OUTPATIENT)
Dept: FAMILY MEDICINE | Facility: OTHER | Age: 61
End: 2021-12-31
Attending: FAMILY MEDICINE
Payer: COMMERCIAL

## 2021-12-31 DIAGNOSIS — R09.89 RUNNY NOSE: ICD-10-CM

## 2021-12-31 DIAGNOSIS — Z20.822 EXPOSURE TO 2019 NOVEL CORONAVIRUS: ICD-10-CM

## 2021-12-31 DIAGNOSIS — R53.83 LETHARGIC: Primary | ICD-10-CM

## 2021-12-31 DIAGNOSIS — R51.9 HEAD ACHE: ICD-10-CM

## 2021-12-31 PROCEDURE — C9803 HOPD COVID-19 SPEC COLLECT: HCPCS

## 2021-12-31 PROCEDURE — U0003 INFECTIOUS AGENT DETECTION BY NUCLEIC ACID (DNA OR RNA); SEVERE ACUTE RESPIRATORY SYNDROME CORONAVIRUS 2 (SARS-COV-2) (CORONAVIRUS DISEASE [COVID-19]), AMPLIFIED PROBE TECHNIQUE, MAKING USE OF HIGH THROUGHPUT TECHNOLOGIES AS DESCRIBED BY CMS-2020-01-R: HCPCS | Mod: ZL

## 2021-12-31 NOTE — PROGRESS NOTES
Patient swabbed for COVID-19 testing.  Headcold runny nose letrargic  Karyn Gee MA on 12/31/2021 at 1:16 PM

## 2022-01-23 DIAGNOSIS — M54.16 LUMBAR BACK PAIN WITH RADICULOPATHY AFFECTING RIGHT LOWER EXTREMITY: ICD-10-CM

## 2022-01-24 RX ORDER — GABAPENTIN 300 MG/1
300 CAPSULE ORAL 3 TIMES DAILY
Qty: 90 CAPSULE | Refills: 3 | Status: SHIPPED | OUTPATIENT
Start: 2022-01-24 | End: 2022-08-29

## 2022-01-24 NOTE — TELEPHONE ENCOUNTER
[id number] sent Rx request for the following:   gabapentin (NEURONTIN) 300 MG capsule  SigTAKE 1 CAPSULE (300 MG) BY MOUTH 3 TIMES DAILY    Last Prescription Date:   09/16/2021  Last Fill Qty/Refills:         90, R-3    Last Office Visit:              09/16/2021 (Haris)   Future Office visit:           None noted    Routing refill request to provider for review/approval because:  Drug not on the [facility] or [facility] refill protocol or controlled substance    Unable to complete prescription refill per RN Medication Refill Policy.................... Gavi Landry RN ....................  1/24/2022   2:38 PM

## 2022-01-29 ENCOUNTER — OFFICE VISIT (OUTPATIENT)
Dept: FAMILY MEDICINE | Facility: OTHER | Age: 62
End: 2022-01-29
Attending: PHYSICIAN ASSISTANT
Payer: COMMERCIAL

## 2022-01-29 ENCOUNTER — HEALTH MAINTENANCE LETTER (OUTPATIENT)
Age: 62
End: 2022-01-29

## 2022-01-29 VITALS
BODY MASS INDEX: 23.7 KG/M2 | RESPIRATION RATE: 16 BRPM | TEMPERATURE: 96.9 F | HEIGHT: 67 IN | WEIGHT: 151 LBS | SYSTOLIC BLOOD PRESSURE: 128 MMHG | DIASTOLIC BLOOD PRESSURE: 70 MMHG | HEART RATE: 63 BPM | OXYGEN SATURATION: 96 %

## 2022-01-29 DIAGNOSIS — J32.0 LEFT MAXILLARY SINUSITIS: ICD-10-CM

## 2022-01-29 PROCEDURE — 99213 OFFICE O/P EST LOW 20 MIN: CPT | Performed by: FAMILY MEDICINE

## 2022-01-29 ASSESSMENT — PAIN SCALES - GENERAL: PAINLEVEL: MODERATE PAIN (5)

## 2022-01-29 ASSESSMENT — MIFFLIN-ST. JEOR: SCORE: 1438.56

## 2022-01-29 NOTE — PROGRESS NOTES
"  Assessment & Plan     1. Dental infection  Start ABX.  Hot packs.  Continue ibuprofen/tylenol prn.  Salt gargles okay.  Follow up with dentist as planned next week for definitive management.  - amoxicillin-clavulanate (AUGMENTIN) 875-125 MG tablet; Take 1 tablet by mouth 2 times daily for 10 days  Dispense: 20 tablet; Refill: 0      Carmencita FELICITA Carvajal DO  [facility]    Antolin Appiah is a 61 year old who presents for the following health issues:    HPI   Dental pain/infection to lower bottom front teeth; worsening over the last one week.  No fever, + chills.  + LAD.  Heat.  + TTP.  Has taken ibuprofen/tylenol.  Continues to worsen.  Dentist appointment next week.  Has had poor dental repair issues in the past.      Objective    /70 (BP Location: Left arm, Patient Position: Sitting, Cuff Size: Adult Large)   Pulse 63   Temp 96.9  F (36.1  C) (Tympanic)   Resp 16   Ht 1.702 m (5' 7\")   Wt 68.5 kg (151 lb)   SpO2 96%   BMI 23.65 kg/m    Body mass index is 23.65 kg/m .  Physical Exam   GENERAL: healthy, alert and no distress  HENT: normal cephalic/atraumatic, ear canals and TM's normal, oral mucous membranes moist and front lower dentition with gingival recession, discoloration of teeth, eerythema of gums.  No draining abscess.  + TTP with enlarged submandibular LN.  NECK: no asymmetry, masses, or scars and thyroid normal to palpation  SKIN: no suspicious lesions or rashes  PSYCH: mentation appears normal, affect normal/bright    No results found for any visits on 01/29/22.        "

## 2022-01-29 NOTE — PATIENT INSTRUCTIONS
Patient Education     Dental Abscess     An abscess is a pocket of fluid (pus) at the tip of a tooth root in your jawbone. It's caused by an infection at the root of the tooth. It can cause pain and swelling of the gum, cheek, or jaw. Pain may spread from the tooth to your ear. Or pain may spread to the part of your jaw on the same side. If the abscess isn t treated, it looks like a bubble or swelling on the gum near the tooth. The pressure that builds in this swelling causes pain. More serious infections make your face swell.  An abscess can occur when bacteria enter the tooth through a crack in the tooth, a cavity, a gum infection, or a combination of these. The pulp inside the tooth gets infected. Then bacteria can spread down the roots to the tip. If the bacteria are not stopped, they can harm the bone and soft tissue. Then an abscess can form.  Symptoms can include pain, redness, or swelling of the gums, and fever.  Home care  Follow these guidelines when caring for yourself at home:    Don't have hot and cold foods and drinks. Your tooth may be sensitive to changes in temperature. Don t chew on the side of the infected tooth.    If your tooth is chipped or cracked, or if there is a large open cavity, put clove oil right on the tooth to ease pain. You can buy clove oil at pharmacies. Some pharmacies carry an over-the-counter toothache kit. This has a paste that you can put on the exposed tooth to make it less sensitive.    Put a cold pack on your jaw over the sore area to help ease pain.    You may use over-the-counter medicine to ease pain, unless another medicine was prescribed. If you have chronic liver or kidney disease or if you've had a stomach ulcer or GI bleeding, talk with your healthcare provider before using acetaminophen or ibuprofen.    An antibiotic will be prescribed. Take it until finished, even if you are feeling better after a few days.  Follow-up care  Follow up with your dentist or an oral  surgeon, or as advised. Once an infection occurs in a tooth, it will be a problem until the infection is drained. This is done through surgery or a root canal. Or you may need to have your tooth pulled.  Call 911  Call 911 if any of these occur:    Abnormal drowsiness    Headache or stiff neck    Weakness or fainting    Trouble swallowing, breathing, or opening your mouth    Swollen eyelids  When to get medical advice  Call your healthcare provider right away if any of these occur:    Your face gets more swollen or red    Pain gets worse or spreads to your neck    Fever of 100.4 F (38.0 C) or higher, or as directed by your provider    Pus drains from the tooth  VANDOLAY last reviewed this educational content on 12/1/2019 2000-2021 [employer]. All rights reserved. This information is not intended as a substitute for professional medical care. Always follow your healthcare professional's instructions.

## 2022-01-29 NOTE — NURSING NOTE
"Chief Complaint   Patient presents with     Mouth Problem     Patient presented to the clinic with a possible mouth infection that started about a week ago with a bad tooth on the lower front.    Initial /70 (BP Location: Left arm, Patient Position: Sitting, Cuff Size: Adult Large)   Pulse 63   Temp 96.9  F (36.1  C) (Tympanic)   Resp 16   Ht 1.702 m (5' 7\")   Wt 68.5 kg (151 lb)   SpO2 96%   BMI 23.65 kg/m   Estimated body mass index is 23.65 kg/m  as calculated from the following:    Height as of this encounter: 1.702 m (5' 7\").    Weight as of this encounter: 68.5 kg (151 lb).       FOOD SECURITY SCREENING QUESTIONS:    The next two questions are to help us understand your food security.  If you are feeling you need any assistance in this area, we have resources available to support you today.    Hunger Vital Signs:  Within the past 12 months we worried whether our food would run out before we got money to buy more. Never  Within the past 12 months the food we bought just didn't last and we didn't have money to get more. Never      Medication Reconciliation: Complete      Nereyda Key LPN  "

## 2022-02-23 DIAGNOSIS — F41.1 ANXIETY STATE: ICD-10-CM

## 2022-02-23 NOTE — TELEPHONE ENCOUNTER
Routing refill request to provider for review/approval because:  LOV: 1/29/2022    Melissa Louis RN on 2/23/2022 at 3:43 PM

## 2022-02-24 RX ORDER — PAROXETINE 30 MG/1
30 TABLET, FILM COATED ORAL EVERY MORNING
Qty: 90 TABLET | Refills: 1 | Status: SHIPPED | OUTPATIENT
Start: 2022-02-24 | End: 2022-08-12

## 2022-08-11 DIAGNOSIS — F41.1 ANXIETY STATE: ICD-10-CM

## 2022-08-12 RX ORDER — PAROXETINE 30 MG/1
30 TABLET, FILM COATED ORAL EVERY MORNING
Qty: 90 TABLET | Refills: 0 | Status: SHIPPED | OUTPATIENT
Start: 2022-08-12 | End: 2022-12-04

## 2022-08-12 NOTE — TELEPHONE ENCOUNTER
"Per pharmacy request, \"We are filling last refill today and the patient is requesting authorization to refill once that supply has been used. Thank you\"!     Disp Refills Start End ADEEL   PARoxetine (PAXIL) 30 MG tablet 90 tablet 1 2/24/2022  No   Sig - Route: Take 1 tablet (30 mg) by mouth every morning - Oral   Sent to pharmacy as: PARoxetine HCl 30 MG Oral Tablet (PAXIL)   Class: E-Prescribe     Last Office Visit: 01/29/2022  Future Office visit:         Requested Prescriptions   Pending Prescriptions Disp Refills     PARoxetine (PAXIL) 30 MG tablet [Pharmacy Med Name: PAROXETINE 30MG TABLET] 90 tablet 1     Sig: TAKE 1 TABLET (30 MG) BY MOUTH EVERY MORNING       SSRIs Protocol Passed - 8/11/2022  4:49 PM        Passed - Recent (12 mo) or future (30 days) visit within the authorizing provider's specialty     Patient has had an office visit with the authorizing provider or a provider within the authorizing providers department within the previous 12 mos or has a future within next 30 days. See \"Patient Info\" tab in inbasket, or \"Choose Columns\" in Meds & Orders section of the refill encounter.              Passed - Medication is active on med list        Passed - Patient is age 18 or older             Routing refill request to provider for review/approval.     Unable to complete prescription refill per RNMedication Refill Policy.................... Lydia Tinajero RN ....................  8/12/2022   8:18 AM          "

## 2022-08-25 NOTE — TELEPHONE ENCOUNTER
PARoxetine (PAXIL) 30 MG tablet 90 tablet 4 3/3/2021  No   Sig - Route: Take 1 tablet (30 mg) by mouth every morning - Oral   Sent to pharmacy as: PARoxetine HCl 30 MG Oral Tablet (PAXIL)   Class: E-Prescribe   Order: [id number]   E-Prescribing Status: Receipt confirmed by pharmacy (3/3/2021  4:05 PM CST)     Redundant refill request refused: Too soon:    Unable to complete prescription refill per RN Medication Refill Policy.................... Gavi Landry RN ....................  12/15/2021   12:11 PM         no fever and no chills.

## 2022-08-27 DIAGNOSIS — M54.16 LUMBAR BACK PAIN WITH RADICULOPATHY AFFECTING RIGHT LOWER EXTREMITY: ICD-10-CM

## 2022-08-27 NOTE — LETTER
September 20, 2022      Td Leonardo  [address] [address] [place] [address]        Dear Td,     This letter is to remind you that you are due for your annual exam with David Carballo. Your last comprehensive visit was more than 12 months ago.    Please call the clinic at [phone] to schedule your appointment.    Thank you for choosing [facility] and [facility] for your health care needs.    Sincerely,    Gilberto HU  [facility]

## 2022-08-29 NOTE — TELEPHONE ENCOUNTER
[id number] ([facility] of [place] sent Rx request for the following:      Requested Prescriptions   Pending Prescriptions Disp Refills     gabapentin (NEURONTIN) 300 MG capsule [Pharmacy Med Name: GABAPENTIN 300MG CAPSULE] 90 capsule 3     Sig: TAKE 1 CAPSULE BY MOUTH THREE TIMES DAILY   Last Prescription Date:   1/24/22  Last Fill Qty/Refills:         90, R-3    Last Office Visit:              1/29/22   Future Office visit:           None    Karly Riley RN .............. 8/29/2022  1:58 PM

## 2022-09-01 RX ORDER — GABAPENTIN 300 MG/1
CAPSULE ORAL
Qty: 90 CAPSULE | Refills: 0 | Status: SHIPPED | OUTPATIENT
Start: 2022-09-01 | End: 2022-10-06

## 2022-09-01 NOTE — TELEPHONE ENCOUNTER
Please reach out to patient and assist with scheduling annual physical.KATERINE Young CNP on 9/1/2022 at 8:03 AM

## 2022-09-12 NOTE — TELEPHONE ENCOUNTER
LMTCB to schedule appointment.    Needs annual physical.    Carlie Alicea on 9/12/2022 at 1:25 PM

## 2022-09-15 DIAGNOSIS — F41.1 ANXIETY STATE: ICD-10-CM

## 2022-09-16 RX ORDER — PAROXETINE 30 MG/1
30 TABLET, FILM COATED ORAL EVERY MORNING
Qty: 90 TABLET | Refills: 0 | OUTPATIENT
Start: 2022-09-16

## 2022-09-16 NOTE — TELEPHONE ENCOUNTER
[facility]    Patient due for annual exam.    Carlie Alicea on 9/16/2022 at 1:29 PM

## 2022-09-16 NOTE — TELEPHONE ENCOUNTER
"Refused, request to soon, filled 90 tabs 8/12/22  Carmela Huerta RN on 9/16/2022 at 3:41 PM     Requested Prescriptions   Pending Prescriptions Disp Refills     PARoxetine (PAXIL) 30 MG tablet [Pharmacy Med Name: PAROXETINE 30MG TABLET] 90 tablet 0     Sig: TAKE 1 TABLET (30 MG) BY MOUTH EVERY MORNING       SSRIs Protocol Passed - 9/15/2022  5:45 PM        Passed - Recent (12 mo) or future (30 days) visit within the authorizing provider's specialty     Patient has had an office visit with the authorizing provider or a provider within the authorizing providers department within the previous 12 mos or has a future within next 30 days. See \"Patient Info\" tab in inbasket, or \"Choose Columns\" in Meds & Orders section of the refill encounter.              Passed - Medication is active on med list        Passed - Patient is age 18 or older             "

## 2022-09-17 ENCOUNTER — HEALTH MAINTENANCE LETTER (OUTPATIENT)
Age: 62
End: 2022-09-17

## 2022-09-20 NOTE — TELEPHONE ENCOUNTER
LMB x 3 to schedule appointment.    Patient due for annual exam. Please send letter.    Carlie Alicea on 9/20/2022 at 4:11 PM

## 2022-10-04 DIAGNOSIS — M54.16 LUMBAR BACK PAIN WITH RADICULOPATHY AFFECTING RIGHT LOWER EXTREMITY: ICD-10-CM

## 2022-10-04 NOTE — LETTER
October 17, 2022      Td Leonardo  [id number] [address] [place] [address]        Dear Td,     This letter is to remind you that you are due for your annual exam with David Carballo. Your last comprehensive visit was more than 12 months ago.    Please call the clinic at [phone] to schedule your appointment.    Thank you for choosing [facility] and [facility] for your health care needs.    Sincerely,    Gilberto HU  [facility]

## 2022-10-06 RX ORDER — GABAPENTIN 300 MG/1
CAPSULE ORAL
Qty: 90 CAPSULE | Refills: 0 | Status: SHIPPED | OUTPATIENT
Start: 2022-10-06 | End: 2022-11-03

## 2022-10-06 NOTE — TELEPHONE ENCOUNTER
Routing to pcp and outreach to contact pt to schedule his annual visit which is overdue  Carmela Huerta RN on 10/6/2022 at 3:13 PM    Last Prescription Date: 9/1/22  Last Qty/Refills: 90 / 0  Last Office Visit: 9/16/21  Future Office Visit: None     Requested Prescriptions   Pending Prescriptions Disp Refills     gabapentin (NEURONTIN) 300 MG capsule [Pharmacy Med Name: GABAPENTIN 300MG CAPSULE] 90 capsule 0     Sig: TAKE 1 CAPSULE BY MOUTH THREE TIMES DAILY       There is no refill protocol information for this order

## 2022-10-11 NOTE — TELEPHONE ENCOUNTER
LMTCB to schedule appointment.    Pt is due for annual exam.    Carlie Alicea on 10/11/2022 at 1:26 PM

## 2022-10-13 NOTE — TELEPHONE ENCOUNTER
TCB x 2 to schedule appointment.    Pt is due for annual exam.    Carlie Alicea on 10/13/2022 at 1:19 PM

## 2022-10-17 NOTE — TELEPHONE ENCOUNTER
Bed: 03  Expected date:   Expected time:   Means of arrival:   Comments:  ambulance   TCB x 3 to schedule appointment.    Pt is due for annual exam. Please send letter.    Carlie Alicea on 10/17/2022 at 2:45 PM

## 2022-11-03 DIAGNOSIS — M54.16 LUMBAR BACK PAIN WITH RADICULOPATHY AFFECTING RIGHT LOWER EXTREMITY: ICD-10-CM

## 2022-11-03 RX ORDER — GABAPENTIN 300 MG/1
CAPSULE ORAL
Qty: 90 CAPSULE | Refills: 0 | Status: SHIPPED | OUTPATIENT
Start: 2022-11-03 | End: 2022-12-05

## 2022-11-03 NOTE — TELEPHONE ENCOUNTER
[id number] ([employer] of [place] sent Rx request for the following:    Patient enrolled in our Rx Med Sync service to improve adherence. We are requesting a refill authorization in advance to ensure an active prescription is on file.     Requested Prescriptions   Pending Prescriptions Disp Refills     gabapentin (NEURONTIN) 300 MG capsule [Pharmacy Med Name: GABAPENTIN 300MG CAPSULE] 90 capsule 0     Sig: TAKE 1 CAPSULE BY MOUTH THREE TIMES DAILY   Last Prescription Date:   10/6/22  Last Fill Qty/Refills:         90, R-0    Last Office Visit:              9/16/21   Future Office visit:           None    Pt due for annual exam. Routing to provider for refill consideration. Routing to  OUTREACH APPT REQUESTS pool, to assist Pt in scheduling appointment.     Karly Riley RN .............. 11/3/2022  2:19 PM

## 2022-11-15 NOTE — TELEPHONE ENCOUNTER
LMTCB to schedule appointment.    Pt is due for annual exam.    Carlie Alicea on 11/15/2022 at 10:10 AM

## 2022-12-01 DIAGNOSIS — F41.1 ANXIETY STATE: ICD-10-CM

## 2022-12-01 DIAGNOSIS — N52.9 IMPOTENCE OF ORGANIC ORIGIN: ICD-10-CM

## 2022-12-01 NOTE — TELEPHONE ENCOUNTER
Called patient and notified him he is due for a physical LOV was 09/16/2021.  He will be out of both cialis and paxil medications by Dec 22 nd when he scheduled his physical. Maricel Giang LPN .......................12/1/2022  3:58 PM

## 2022-12-03 DIAGNOSIS — M54.16 LUMBAR BACK PAIN WITH RADICULOPATHY AFFECTING RIGHT LOWER EXTREMITY: ICD-10-CM

## 2022-12-04 RX ORDER — PAROXETINE 30 MG/1
30 TABLET, FILM COATED ORAL EVERY MORNING
Qty: 90 TABLET | Refills: 0 | Status: SHIPPED | OUTPATIENT
Start: 2022-12-04 | End: 2022-12-22

## 2022-12-04 RX ORDER — TADALAFIL 20 MG/1
20 TABLET ORAL DAILY PRN
Qty: 30 TABLET | Refills: 0 | Status: SHIPPED | OUTPATIENT
Start: 2022-12-04 | End: 2022-12-22

## 2022-12-05 RX ORDER — GABAPENTIN 300 MG/1
CAPSULE ORAL
Qty: 90 CAPSULE | Refills: 0 | Status: SHIPPED | OUTPATIENT
Start: 2022-12-05 | End: 2022-12-22

## 2022-12-05 NOTE — TELEPHONE ENCOUNTER
[id number] ([facility] sent Rx request for the following:      Requested Prescriptions   Pending Prescriptions Disp Refills     gabapentin (NEURONTIN) 300 MG capsule [Pharmacy Med Name: GABAPENTIN 300MG CAPSULE] 90 capsule 0     Sig: TAKE 1 CAPSULE BY MOUTH THREE TIMES DAILY       There is no refill protocol information for this order        Last Prescription Date:   11/03/22  Last Fill Qty/Refills:         90, R-0  Last Office Visit:              01/29/22  Future Office visit:             Next 5 appointments (look out 90 days)    Dec 22, 2022  1:00 PM  PHYSICAL with David Carballo MD  [facility] ([facility] ) [address]  [place] [id number] [phone]        Shyla Giang RN on 12/5/2022 at 10:50 AM

## 2022-12-22 ENCOUNTER — OFFICE VISIT (OUTPATIENT)
Dept: FAMILY MEDICINE | Facility: OTHER | Age: 62
End: 2022-12-22
Attending: FAMILY MEDICINE
Payer: COMMERCIAL

## 2022-12-22 VITALS
HEIGHT: 67 IN | WEIGHT: 148.4 LBS | TEMPERATURE: 96.8 F | HEART RATE: 86 BPM | SYSTOLIC BLOOD PRESSURE: 122 MMHG | OXYGEN SATURATION: 96 % | BODY MASS INDEX: 23.29 KG/M2 | DIASTOLIC BLOOD PRESSURE: 70 MMHG

## 2022-12-22 DIAGNOSIS — F41.1 ANXIETY STATE: ICD-10-CM

## 2022-12-22 DIAGNOSIS — Z00.00 PHYSICAL EXAM: Primary | ICD-10-CM

## 2022-12-22 DIAGNOSIS — N52.9 IMPOTENCE OF ORGANIC ORIGIN: ICD-10-CM

## 2022-12-22 DIAGNOSIS — Z12.11 SPECIAL SCREENING FOR MALIGNANT NEOPLASMS, COLON: ICD-10-CM

## 2022-12-22 DIAGNOSIS — M54.16 LUMBAR BACK PAIN WITH RADICULOPATHY AFFECTING RIGHT LOWER EXTREMITY: ICD-10-CM

## 2022-12-22 DIAGNOSIS — Z91.89 AT RISK FOR HEART DISEASE: ICD-10-CM

## 2022-12-22 LAB
ALBUMIN SERPL BCG-MCNC: 4.6 G/DL (ref 3.5–5.2)
ALP SERPL-CCNC: 78 U/L (ref 40–129)
ALT SERPL W P-5'-P-CCNC: 19 U/L (ref 10–50)
ANION GAP SERPL CALCULATED.3IONS-SCNC: 8 MMOL/L (ref 7–15)
AST SERPL W P-5'-P-CCNC: 26 U/L (ref 10–50)
BASOPHILS # BLD AUTO: 0 10E3/UL (ref 0–0.2)
BASOPHILS NFR BLD AUTO: 0 %
BILIRUB SERPL-MCNC: 0.3 MG/DL
BUN SERPL-MCNC: 9.6 MG/DL (ref 8–23)
CALCIUM SERPL-MCNC: 9.7 MG/DL (ref 8.8–10.2)
CHLORIDE SERPL-SCNC: 103 MMOL/L (ref 98–107)
CREAT SERPL-MCNC: 0.71 MG/DL (ref 0.67–1.17)
DEPRECATED HCO3 PLAS-SCNC: 28 MMOL/L (ref 22–29)
EOSINOPHIL # BLD AUTO: 0.1 10E3/UL (ref 0–0.7)
EOSINOPHIL NFR BLD AUTO: 1 %
ERYTHROCYTE [DISTWIDTH] IN BLOOD BY AUTOMATED COUNT: 12.9 % (ref 10–15)
GFR SERPL CREATININE-BSD FRML MDRD: >90 ML/MIN/1.73M2
GLUCOSE SERPL-MCNC: 100 MG/DL (ref 70–99)
HCT VFR BLD AUTO: 46.8 % (ref 40–53)
HGB BLD-MCNC: 16.1 G/DL (ref 13.3–17.7)
IMM GRANULOCYTES # BLD: 0 10E3/UL
IMM GRANULOCYTES NFR BLD: 0 %
LYMPHOCYTES # BLD AUTO: 3.3 10E3/UL (ref 0.8–5.3)
LYMPHOCYTES NFR BLD AUTO: 38 %
MCH RBC QN AUTO: 31.4 PG (ref 26.5–33)
MCHC RBC AUTO-ENTMCNC: 34.4 G/DL (ref 31.5–36.5)
MCV RBC AUTO: 91 FL (ref 78–100)
MONOCYTES # BLD AUTO: 0.6 10E3/UL (ref 0–1.3)
MONOCYTES NFR BLD AUTO: 7 %
NEUTROPHILS # BLD AUTO: 4.6 10E3/UL (ref 1.6–8.3)
NEUTROPHILS NFR BLD AUTO: 54 %
NRBC # BLD AUTO: 0 10E3/UL
NRBC BLD AUTO-RTO: 0 /100
PLATELET # BLD AUTO: 332 10E3/UL (ref 150–450)
POTASSIUM SERPL-SCNC: 4 MMOL/L (ref 3.4–5.3)
PROT SERPL-MCNC: 7.1 G/DL (ref 6.4–8.3)
PSA SERPL-MCNC: 0.76 NG/ML (ref 0–4.5)
RBC # BLD AUTO: 5.12 10E6/UL (ref 4.4–5.9)
SODIUM SERPL-SCNC: 139 MMOL/L (ref 136–145)
WBC # BLD AUTO: 8.6 10E3/UL (ref 4–11)

## 2022-12-22 PROCEDURE — 85025 COMPLETE CBC W/AUTO DIFF WBC: CPT | Mod: ZL | Performed by: FAMILY MEDICINE

## 2022-12-22 PROCEDURE — 90715 TDAP VACCINE 7 YRS/> IM: CPT | Performed by: FAMILY MEDICINE

## 2022-12-22 PROCEDURE — 90471 IMMUNIZATION ADMIN: CPT | Performed by: FAMILY MEDICINE

## 2022-12-22 PROCEDURE — 99396 PREV VISIT EST AGE 40-64: CPT | Mod: 25 | Performed by: FAMILY MEDICINE

## 2022-12-22 PROCEDURE — 80053 COMPREHEN METABOLIC PANEL: CPT | Mod: ZL | Performed by: FAMILY MEDICINE

## 2022-12-22 PROCEDURE — G0103 PSA SCREENING: HCPCS | Mod: ZL | Performed by: FAMILY MEDICINE

## 2022-12-22 PROCEDURE — 36415 COLL VENOUS BLD VENIPUNCTURE: CPT | Mod: ZL | Performed by: FAMILY MEDICINE

## 2022-12-22 PROCEDURE — 84153 ASSAY OF PSA TOTAL: CPT | Mod: ZL | Performed by: FAMILY MEDICINE

## 2022-12-22 RX ORDER — GABAPENTIN 300 MG/1
300 CAPSULE ORAL 3 TIMES DAILY
Qty: 270 CAPSULE | Refills: 4 | Status: SHIPPED | OUTPATIENT
Start: 2022-12-22 | End: 2023-12-26

## 2022-12-22 RX ORDER — PAROXETINE 30 MG/1
30 TABLET, FILM COATED ORAL EVERY MORNING
Qty: 90 TABLET | Refills: 4 | Status: SHIPPED | OUTPATIENT
Start: 2022-12-22 | End: 2023-12-26

## 2022-12-22 RX ORDER — ATORVASTATIN CALCIUM 20 MG/1
20 TABLET, FILM COATED ORAL DAILY
Qty: 90 TABLET | Refills: 3 | Status: SHIPPED | OUTPATIENT
Start: 2022-12-22 | End: 2023-12-26

## 2022-12-22 RX ORDER — TADALAFIL 20 MG/1
20 TABLET ORAL DAILY PRN
Qty: 30 TABLET | Refills: 4 | Status: SHIPPED | OUTPATIENT
Start: 2022-12-22

## 2022-12-22 ASSESSMENT — ENCOUNTER SYMPTOMS
HEADACHES: 0
MYALGIAS: 0
HEARTBURN: 0
ABDOMINAL PAIN: 0
HEMATURIA: 0
JOINT SWELLING: 0
WEAKNESS: 0
FREQUENCY: 0
HEMATOCHEZIA: 0
NERVOUS/ANXIOUS: 0
DIARRHEA: 0
DYSURIA: 0
SORE THROAT: 0
COUGH: 0
EYE PAIN: 0
PALPITATIONS: 0
SHORTNESS OF BREATH: 0
ARTHRALGIAS: 1
CONSTIPATION: 0
DIZZINESS: 0
FEVER: 0
NAUSEA: 0
CHILLS: 0
PARESTHESIAS: 1

## 2022-12-22 ASSESSMENT — PAIN SCALES - GENERAL: PAINLEVEL: NO PAIN (0)

## 2022-12-22 ASSESSMENT — ANXIETY QUESTIONNAIRES
3. WORRYING TOO MUCH ABOUT DIFFERENT THINGS: NOT AT ALL
GAD7 TOTAL SCORE: 1
1. FEELING NERVOUS, ANXIOUS, OR ON EDGE: NOT AT ALL
2. NOT BEING ABLE TO STOP OR CONTROL WORRYING: NOT AT ALL
6. BECOMING EASILY ANNOYED OR IRRITABLE: NOT AT ALL
8. IF YOU CHECKED OFF ANY PROBLEMS, HOW DIFFICULT HAVE THESE MADE IT FOR YOU TO DO YOUR WORK, TAKE CARE OF THINGS AT HOME, OR GET ALONG WITH OTHER PEOPLE?: SOMEWHAT DIFFICULT
4. TROUBLE RELAXING: NOT AT ALL
GAD7 TOTAL SCORE: 1
7. FEELING AFRAID AS IF SOMETHING AWFUL MIGHT HAPPEN: SEVERAL DAYS
IF YOU CHECKED OFF ANY PROBLEMS ON THIS QUESTIONNAIRE, HOW DIFFICULT HAVE THESE PROBLEMS MADE IT FOR YOU TO DO YOUR WORK, TAKE CARE OF THINGS AT HOME, OR GET ALONG WITH OTHER PEOPLE: SOMEWHAT DIFFICULT
7. FEELING AFRAID AS IF SOMETHING AWFUL MIGHT HAPPEN: SEVERAL DAYS
5. BEING SO RESTLESS THAT IT IS HARD TO SIT STILL: NOT AT ALL

## 2022-12-22 NOTE — NURSING NOTE
Patient here for yearly physical and medication refills. Last eye exam 4 years prior and last dental exam 4 years ago as well. Medication Reconciliation: complete.    Maricel Giang LPN  12/22/2022 1:11 PM

## 2022-12-22 NOTE — PROGRESS NOTES
SUBJECTIVE:   Td Leonardo is a 62 year old male who presents to clinic today for the following health issues: Physical  The 10-year ASCVD risk score (Brad ARCE, et al., 2019) is: 15.5%    Values used to calculate the score:      Age: 62 years      Sex: Male      Is Non- : No      Diabetic: No      Tobacco smoker: Yes      Systolic Blood Pressure: 122 mmHg      Is BP treated: No      HDL Cholesterol: 49 mg/dL      Total Cholesterol: 199 mg/dL      Patient arrives here for physical and annual exam.  Review of the chart shows he is in need of a CT scan screening for lung cancer.  I discussed this with him and he decided not to pursue this.  Possibly next year.  Otherwise no other complaints    Healthy Habits:     Getting at least 3 servings of Calcium per day:  Yes    Bi-annual eye exam:  NO    Dental care twice a year:  NO    Sleep apnea or symptoms of sleep apnea:  None    Diet:  Regular (no restrictions)    Frequency of exercise:  2-3 days/week    Duration of exercise:  15-30 minutes    Taking medications regularly:  Yes    Medication side effects:  None    PHQ-2 Total Score: 0    Additional concerns today:  No        Patient Active Problem List    Diagnosis Date Noted     Impotence of organic origin 01/24/2019     Priority: Medium     Anxiety state 01/30/2018     Priority: Medium     Psychosexual dysfunction with inhibited sexual excitement 01/30/2018     Priority: Medium     Overview:   Impotence       H/O colonoscopy 07/13/2017     Priority: Medium     Physical exam 07/13/2017     Priority: Medium     Tobacco use disorder 01/25/2012     Priority: Medium     Backache 06/16/2011     Priority: Medium     Past Medical History:   Diagnosis Date     Strain of muscle, fascia and tendon of lower back, initial encounter     No Comments Provided      Past Surgical History:   Procedure Laterality Date     TONSILLECTOMY      No Comments Provided     VASECTOMY      2004     Social History  "    Social History Narrative    Single, has a significant other for last 10 years.     Current Outpatient Medications   Medication Sig Dispense Refill     atorvastatin (LIPITOR) 20 MG tablet Take 1 tablet (20 mg) by mouth daily 90 tablet 3     gabapentin (NEURONTIN) 300 MG capsule Take 1 capsule (300 mg) by mouth 3 times daily 270 capsule 4     PARoxetine (PAXIL) 30 MG tablet Take 1 tablet (30 mg) by mouth every morning 90 tablet 4     tadalafil (CIALIS) 20 MG tablet Take 1 tablet (20 mg) by mouth daily as needed 30 tablet 4     No Known Allergies    Review of Systems     OBJECTIVE:     /70   Pulse 86   Temp 96.8  F (36  C)   Ht 1.702 m (5' 7\")   Wt 67.3 kg (148 lb 6.4 oz)   SpO2 96%   BMI 23.24 kg/m    Body mass index is 23.24 kg/m .  Physical Exam  Constitutional:       Appearance: Normal appearance.   HENT:      Head: Normocephalic and atraumatic.      Right Ear: Tympanic membrane normal.      Left Ear: Tympanic membrane normal.      Mouth/Throat:      Mouth: Mucous membranes are moist.   Eyes:      Pupils: Pupils are equal, round, and reactive to light.   Cardiovascular:      Rate and Rhythm: Normal rate and regular rhythm.   Abdominal:      General: Abdomen is flat.   Neurological:      Mental Status: He is alert.   Psychiatric:         Mood and Affect: Mood normal.         Diagnostic Test Results:  Results for orders placed or performed in visit on 12/22/22 (from the past 24 hour(s))   Comprehensive Metabolic Panel   Result Value Ref Range    Sodium 139 136 - 145 mmol/L    Potassium 4.0 3.4 - 5.3 mmol/L    Chloride 103 98 - 107 mmol/L    Carbon Dioxide (CO2) 28 22 - 29 mmol/L    Anion Gap 8 7 - 15 mmol/L    Urea Nitrogen 9.6 8.0 - 23.0 mg/dL    Creatinine 0.71 0.67 - 1.17 mg/dL    Calcium 9.7 8.8 - 10.2 mg/dL    Glucose 100 (H) 70 - 99 mg/dL    Alkaline Phosphatase 78 40 - 129 U/L    AST 26 10 - 50 U/L    ALT 19 10 - 50 U/L    Protein Total 7.1 6.4 - 8.3 g/dL    Albumin 4.6 3.5 - 5.2 g/dL    " Bilirubin Total 0.3 <=1.2 mg/dL    GFR Estimate >90 >60 mL/min/1.73m2   CBC and Differential    Narrative    The following orders were created for panel order CBC and Differential.  Procedure                               Abnormality         Status                     ---------                               -----------         ------                     CBC with platelets and d...[[id number]]                      Final result                 Please view results for these tests on the individual orders.   PSA Screen GH   Result Value Ref Range    Prostate Specific Antigen Screen 0.76 0.00 - 4.50 ng/mL    Narrative    This result is obtained using the Roche Elecsys total PSA method on the pako [id number] immunoassay analyzer. Results obtained with different assay methods or kits cannot be used interchangeably.   CBC with platelets and differential   Result Value Ref Range    WBC Count 8.6 4.0 - 11.0 10e3/uL    RBC Count 5.12 4.40 - 5.90 10e6/uL    Hemoglobin 16.1 13.3 - 17.7 g/dL    Hematocrit 46.8 40.0 - 53.0 %    MCV 91 78 - 100 fL    MCH 31.4 26.5 - 33.0 pg    MCHC 34.4 31.5 - 36.5 g/dL    RDW 12.9 10.0 - 15.0 %    Platelet Count 332 150 - 450 10e3/uL    % Neutrophils 54 %    % Lymphocytes 38 %    % Monocytes 7 %    % Eosinophils 1 %    % Basophils 0 %    % Immature Granulocytes 0 %    NRBCs per 100 WBC 0 <1 /100    Absolute Neutrophils 4.6 1.6 - 8.3 10e3/uL    Absolute Lymphocytes 3.3 0.8 - 5.3 10e3/uL    Absolute Monocytes 0.6 0.0 - 1.3 10e3/uL    Absolute Eosinophils 0.1 0.0 - 0.7 10e3/uL    Absolute Basophils 0.0 0.0 - 0.2 10e3/uL    Absolute Immature Granulocytes 0.0 <=0.4 10e3/uL    Absolute NRBCs 0.0 10e3/uL       ASSESSMENT/PLAN:         (Z00.00) Physical exam  (primary encounter diagnosis)  Comment:   Plan: TDAP VACCINE (Adacel, Boostrix)  [[id number]], CBC        and Differential, Comprehensive Metabolic         Panel, PSA Screen GH        Immunizations updated    (M54.16) Lumbar back pain with radiculopathy  affecting right lower extremity  Comment: Patient reports he is getting very good results.  We will continue  Plan: gabapentin (NEURONTIN) 300 MG capsule            (F41.1) Anxiety state  Comment: Good results on the current dose  Plan: PARoxetine (PAXIL) 30 MG tablet            (N52.9) Impotence of organic origin  Comment: Refill  Plan: tadalafil (CIALIS) 20 MG tablet            (Z12.11) Special screening for malignant neoplasms, colon  Comment:   Plan: Colonoscopy Screening  Referral        Also discussed Cologuard patient wishes to pursue colonoscopy  (Z91.89) At risk for heart disease  Comment: Patient is at significant risk for developing coronary vascular disease.  We will start Lipitor.  Plan: atorvastatin (LIPITOR) 20 MG tablet                David Carballo MD  [facility]  Answers for HPI/ROS submitted by the patient on 12/22/2022  REYNA 7 TOTAL SCORE: 1

## 2023-01-09 ENCOUNTER — OFFICE VISIT (OUTPATIENT)
Dept: FAMILY MEDICINE | Facility: OTHER | Age: 63
End: 2023-01-09
Attending: NURSE PRACTITIONER
Payer: COMMERCIAL

## 2023-01-09 ENCOUNTER — HOSPITAL ENCOUNTER (OUTPATIENT)
Dept: GENERAL RADIOLOGY | Facility: OTHER | Age: 63
Discharge: HOME | End: 2023-01-09
Payer: COMMERCIAL

## 2023-01-09 VITALS
BODY MASS INDEX: 23.18 KG/M2 | OXYGEN SATURATION: 95 % | DIASTOLIC BLOOD PRESSURE: 78 MMHG | WEIGHT: 148 LBS | HEART RATE: 75 BPM | TEMPERATURE: 98.3 F | SYSTOLIC BLOOD PRESSURE: 120 MMHG | RESPIRATION RATE: 20 BRPM

## 2023-01-09 DIAGNOSIS — F17.200 TOBACCO USE DISORDER: ICD-10-CM

## 2023-01-09 DIAGNOSIS — J20.9 ACUTE BRONCHITIS, UNSPECIFIED ORGANISM: ICD-10-CM

## 2023-01-09 DIAGNOSIS — J18.9 MULTIFOCAL PNEUMONIA: ICD-10-CM

## 2023-01-09 DIAGNOSIS — J20.9 ACUTE BRONCHITIS, UNSPECIFIED ORGANISM: Primary | ICD-10-CM

## 2023-01-09 PROCEDURE — 71046 X-RAY EXAM CHEST 2 VIEWS: CPT

## 2023-01-09 PROCEDURE — 99214 OFFICE O/P EST MOD 30 MIN: CPT

## 2023-01-09 RX ORDER — AZITHROMYCIN 250 MG/1
TABLET, FILM COATED ORAL
Qty: 6 TABLET | Refills: 0 | Status: SHIPPED | OUTPATIENT
Start: 2023-01-09 | End: 2023-01-14

## 2023-01-09 RX ORDER — PREDNISONE 20 MG/1
40 TABLET ORAL DAILY
Qty: 10 TABLET | Refills: 0 | Status: SHIPPED | OUTPATIENT
Start: 2023-01-09 | End: 2023-01-14

## 2023-01-09 RX ORDER — BENZONATATE 200 MG/1
100-200 CAPSULE ORAL 3 TIMES DAILY PRN
Qty: 200 CAPSULE | Refills: 1 | Status: SHIPPED | OUTPATIENT
Start: 2023-01-09

## 2023-01-09 RX ORDER — ALBUTEROL SULFATE 90 UG/1
2 AEROSOL, METERED RESPIRATORY (INHALATION) EVERY 6 HOURS PRN
Qty: 18 G | Refills: 0 | Status: SHIPPED | OUTPATIENT
Start: 2023-01-09

## 2023-01-09 ASSESSMENT — ENCOUNTER SYMPTOMS
WHEEZING: 0
CHILLS: 1
EYE REDNESS: 0
COUGH: 1
HEADACHES: 0
RHINORRHEA: 1
SWEATS: 1
SHORTNESS OF BREATH: 1
SORE THROAT: 0

## 2023-01-09 ASSESSMENT — PAIN SCALES - GENERAL: PAINLEVEL: NO PAIN (0)

## 2023-01-09 ASSESSMENT — LIFESTYLE VARIABLES: SMOKING_STATUS: 1

## 2023-01-09 NOTE — PATIENT INSTRUCTIONS
START  Prednisone  Azithromycin  Augmentin         Return in 2 weeks for follow up on chest x-ray       Consider Debrox ear drops for ear wax

## 2023-01-09 NOTE — PROGRESS NOTES
Assessment & Plan   Td Leonardo is a 62 year old presenting for the following health issues:      ICD-10-CM    1. Acute bronchitis, unspecified organism  J20.9 XR Chest 2 Views      2. Tobacco use disorder  F17.200 XR Chest 2 Views      3. Multifocal pneumonia  J18.9 benzonatate (TESSALON) 200 MG capsule     azithromycin (ZITHROMAX) 250 MG tablet     amoxicillin-clavulanate (AUGMENTIN) 875-125 MG tablet     predniSONE (DELTASONE) 20 MG tablet     albuterol (PROAIR HFA/PROVENTIL HFA/VENTOLIN HFA) 108 (90 Base) MCG/ACT inhaler        Chest x-ray shows multifocal pneumonia, recommendation was to have follow-up on this to rule out possible malignancy per x-ray report.  Suspect that patient likely also has COPD in addition to pneumonia and will be treated with 5-day course of prednisone.    5-day course of azithromycin and 10-day course of Augmentin prescribed for pneumonia.    Tessalon Perles and albuterol prescribed for symptoms of cough.      Instructed patient to follow-up in 2 weeks for pneumonia follow-up, also will discuss having a CT scan at that time to rule out possible malignancy.  Patient in agreement with plan.    Encouraged patient to quit smoking.    Return in about 2 weeks (around 1/23/2023) for Follow up.    KATERINE Oliveros CNP  [facility]      Antolin Appiah is a 62 year old, presenting for the following health issues:  Cough (SOB, fever, chills, bodyaches)      Cough  This is a new problem. Episode onset: 5 days ago. The cough is non-productive. The maximum temperature recorded prior to his arrival was 100 to 100.9 F. Associated symptoms include chills, sweats, rhinorrhea and shortness of breath. Pertinent negatives include no chest pain, no ear congestion, no ear pain, no headaches, no sore throat, no wheezing and no eye redness. He has tried cough syrup and decongestants for the symptoms. The treatment provided mild relief. He is a smoker (40 year hx).       Patient reports that he recently had a physical, and refused having a low-dose chest CT at that time.  He continues to smoke, has not been smoking over the last 5 days due to illness.        Review of Systems   Constitutional: Positive for chills.   HENT: Positive for rhinorrhea. Negative for ear pain and sore throat.    Eyes: Negative for redness.   Respiratory: Positive for cough and shortness of breath. Negative for wheezing.    Cardiovascular: Negative for chest pain.   Neurological: Negative for headaches.   All other systems reviewed and are negative.           Objective    /78 (BP Location: Right arm, Patient Position: Sitting, Cuff Size: Adult Regular)   Pulse 75   Temp 98.3  F (36.8  C) (Tympanic)   Resp 20   Wt 67.1 kg (148 lb)   SpO2 95%   BMI 23.18 kg/m    Body mass index is 23.18 kg/m .  Physical Exam  Vitals reviewed.   Constitutional:       Appearance: He is normal weight. He is not ill-appearing or toxic-appearing.   HENT:      Head: Normocephalic and atraumatic.      Right Ear: Tympanic membrane and ear canal normal.      Left Ear: Tympanic membrane and ear canal normal.      Mouth/Throat:      Mouth: Mucous membranes are moist.      Pharynx: Oropharynx is clear.   Eyes:      Pupils: Pupils are equal, round, and reactive to light.   Cardiovascular:      Rate and Rhythm: Normal rate and regular rhythm.      Pulses: Normal pulses.      Heart sounds: No murmur heard.  Pulmonary:      Effort: Pulmonary effort is normal. No tachypnea, bradypnea, accessory muscle usage or respiratory distress.      Breath sounds: Decreased air movement present. Rhonchi present. No wheezing.      Comments: Scattered crackles throughout all bases  Musculoskeletal:         General: No deformity. Normal range of motion.   Skin:     General: Skin is warm and dry.      Capillary Refill: Capillary refill takes less than 2 seconds.      Coloration: Skin is not jaundiced.   Neurological:      General: No focal  deficit present.      Mental Status: He is alert. Mental status is at baseline.        Results for orders placed or performed during the hospital encounter of 01/09/23   XR Chest 2 Views     Status: None    Narrative    PROCEDURE:  XR CHEST 2 VIEWS    HISTORY: Acute bronchitis, unspecified organism; Tobacco use disorder,  .    COMPARISON:  None.    FINDINGS:  The cardiomediastinal contours are normal. The trachea is midline.  Multifocal airspace consolidation is seen in the right upper, middle  and lower lobes as well as possibly the left lower lobe. No effusion  or pneumothorax is seen.    No suspicious osseous lesion or subdiaphragmatic free air.      Impression    IMPRESSION:      Multifocal pneumonia. Recommend delayed follow-up to resolution to  exclude malignancy.      JASWINDER WAGNER MD         SYSTEM ID:  [id number]

## 2023-01-09 NOTE — NURSING NOTE
"Chief Complaint   Patient presents with     Cough     SOB, fever, chills, bodyaches     Patient presents to clinic with cough, body aches, sob and chills. He states this started about Wednesday/Thursday last week.    Initial /78 (BP Location: Right arm, Patient Position: Sitting, Cuff Size: Adult Regular)   Pulse 75   Temp 98.3  F (36.8  C) (Tympanic)   Resp 20   Wt 67.1 kg (148 lb)   SpO2 95%   BMI 23.18 kg/m   Estimated body mass index is 23.18 kg/m  as calculated from the following:    Height as of 12/22/22: 1.702 m (5' 7\").    Weight as of this encounter: 67.1 kg (148 lb).  Medication Reconciliation: complete        Ronda Lizama  "

## 2023-01-25 ENCOUNTER — OFFICE VISIT (OUTPATIENT)
Dept: INTERNAL MEDICINE | Facility: OTHER | Age: 63
End: 2023-01-25
Attending: INTERNAL MEDICINE
Payer: COMMERCIAL

## 2023-01-25 VITALS
WEIGHT: 145.8 LBS | SYSTOLIC BLOOD PRESSURE: 138 MMHG | BODY MASS INDEX: 23.43 KG/M2 | TEMPERATURE: 97.7 F | OXYGEN SATURATION: 98 % | HEART RATE: 72 BPM | RESPIRATION RATE: 20 BRPM | DIASTOLIC BLOOD PRESSURE: 80 MMHG | HEIGHT: 66 IN

## 2023-01-25 DIAGNOSIS — Z87.01 HISTORY OF BACTERIAL PNEUMONIA: ICD-10-CM

## 2023-01-25 DIAGNOSIS — Z87.891 PERSONAL HISTORY OF TOBACCO USE: ICD-10-CM

## 2023-01-25 DIAGNOSIS — F17.200 TOBACCO USE DISORDER: Primary | ICD-10-CM

## 2023-01-25 PROCEDURE — G0296 VISIT TO DETERM LDCT ELIG: HCPCS | Performed by: INTERNAL MEDICINE

## 2023-01-25 PROCEDURE — 99406 BEHAV CHNG SMOKING 3-10 MIN: CPT | Performed by: INTERNAL MEDICINE

## 2023-01-25 PROCEDURE — 99214 OFFICE O/P EST MOD 30 MIN: CPT | Mod: 25 | Performed by: INTERNAL MEDICINE

## 2023-01-25 RX ORDER — POLYETHYLENE GLYCOL 3350 17 G
2 POWDER IN PACKET (EA) ORAL
Qty: 168 LOZENGE | Refills: 11 | Status: SHIPPED | OUTPATIENT
Start: 2023-01-25

## 2023-01-25 RX ORDER — VARENICLINE TARTRATE 1 MG/1
1 TABLET, FILM COATED ORAL 2 TIMES DAILY
Qty: 60 TABLET | Refills: 1 | Status: SHIPPED | OUTPATIENT
Start: 2023-02-22

## 2023-01-25 ASSESSMENT — ENCOUNTER SYMPTOMS
CHEST TIGHTNESS: 0
ABDOMINAL PAIN: 0
CONFUSION: 0
HEMATURIA: 0
BACK PAIN: 0
BRUISES/BLEEDS EASILY: 0
SHORTNESS OF BREATH: 1
CHILLS: 0
UNEXPECTED WEIGHT CHANGE: 0
WOUND: 0
FEVER: 0

## 2023-01-25 ASSESSMENT — PAIN SCALES - GENERAL: PAINLEVEL: NO PAIN (0)

## 2023-01-25 NOTE — PROGRESS NOTES
Assessment & Plan   Td Leonardo is a 62 year old, presenting for the following health issues:      ICD-10-CM    1. Tobacco use disorder  F17.200 varenicline (CHANTIX CHAVA) 0.5 MG X 11 & 1 MG X 42 tablet     varenicline (CHANTIX) 1 MG tablet     SMOKING CESSATION COUNSELING 3-10 MIN     nicotine (COMMIT) 2 MG lozenge      2. History of bacterial pneumonia  Z87.01       3. Personal history of tobacco use  Z87.891 Prof fee: Shared Decision Making for Lung Cancer Screening     SMOKING CESSATION COUNSELING 3-10 MIN     nicotine (COMMIT) 2 MG lozenge     CT Chest Lung Cancer Scrn Low Dose wo     CANCELED: CT Chest Lung Cancer Scrn Low Dose wo      Patient presents for follow-up.  About 2 weeks ago diagnosed with pneumonia.  Reports his breathing is doing substantially better.  He did well with his antibiotics.  Using his albuterol inhaler as needed.  Overall feels considerably better.    Tobacco use.  Ongoing.  He wants to quit smoking.  He is interested in trying Chantix.  He would also like to try some nicotine lozenges.  Discussed risks and benefits.  Side effects.  Prescription sent to pharmacy.  Declines nicotine replacement products - besides lozenges. Rx for chantix sent to pharmacy.   3 minutes spent on tobacco cessation counseling.     Patient qualifies for lung cancer screening CT.  He would like to proceed with imaging as directed.  See separate documentation below.    Encouraged regular exercise.     Return for follow-up as needed for new or worsening symptoms, Appointments: [phone].    Harshil Fowler MD  [facility]     Subjective   Follow up RC - Pneumonia  2 weeks ago      History of Present Illness       Reason for visit:  Follow up on pneumonia  Symptom onset:  1-2 weeks ago  Symptoms include:  Resolved  Symptom progression:  Improving  Had these symptoms before:  No    He eats 0-1 servings of fruits and vegetables daily.He consumes 0 sweetened beverage(s) daily.He exercises  "with enough effort to increase his heart rate 10 to 19 minutes per day.  He exercises with enough effort to increase his heart rate 4 days per week.   He is taking medications regularly.     Review of Systems   Constitutional: Negative for chills, fever and unexpected weight change.   HENT: Negative for congestion.    Eyes: Negative for visual disturbance.   Respiratory: Positive for shortness of breath (Much less and much improved since pneumonia treatment). Negative for chest tightness.    Cardiovascular: Negative for chest pain.   Gastrointestinal: Negative for abdominal pain.   Genitourinary: Negative for hematuria.   Musculoskeletal: Negative for back pain.   Skin: Negative for rash and wound.   Allergic/Immunologic: Negative for immunocompromised state.   Neurological: Negative for syncope.   Hematological: Does not bruise/bleed easily.   Psychiatric/Behavioral: Negative for confusion.          Objective    /80 (BP Location: Right arm, Patient Position: Sitting, Cuff Size: Adult Regular)   Pulse 72   Temp 97.7  F (36.5  C) (Temporal)   Resp 20   Ht 1.664 m (5' 5.5\")   Wt 66.1 kg (145 lb 12.8 oz)   SpO2 98%   BMI 23.89 kg/m    Body mass index is 23.89 kg/m .  Physical Exam  Constitutional:       Appearance: Normal appearance.   Eyes:      General: No scleral icterus.     Conjunctiva/sclera: Conjunctivae normal.   Cardiovascular:      Rate and Rhythm: Normal rate and regular rhythm.      Pulses: Normal pulses.   Pulmonary:      Effort: Pulmonary effort is normal.      Breath sounds: Normal breath sounds.   Abdominal:      Palpations: Abdomen is soft.      Tenderness: There is no abdominal tenderness.   Musculoskeletal:      Right lower leg: No edema.      Left lower leg: No edema.   Skin:     General: Skin is warm and dry.      Findings: No rash.   Neurological:      General: No focal deficit present.      Mental Status: He is alert.   Psychiatric:         Mood and Affect: Mood normal.         " Behavior: Behavior normal.                    Lung Cancer Screening Shared Decision Making Visit     Td Leonardo, a 62 year old male, is eligible for lung cancer screening    History   Smoking Status     Every Day     Packs/day: 1.00     Years: 44.00     Types: Cigarettes   Smokeless Tobacco     Never     I have discussed with patient the risks and benefits of screening for lung cancer with low-dose CT.     The risks include:    radiation exposure: one low dose chest CT has as much ionizing radiation as about 15 chest x-rays, or 6 months of background radiation living in [place]      false positives: most findings/nodules are NOT cancer, but some might still require additional diagnostic evaluation, including biopsy    over-diagnosis: some slow growing cancers that might never have been clinically significant will be detected and treated unnecessarily     The benefit of early detection of lung cancer is contingent upon adherence to annual screening or more frequent follow up if indicated.     Furthermore, to benefit from screening, Td must be willing and able to undergo diagnostic procedures, if indicated. Although no specific guide is available for determining severity of comorbidities, it is reasonable to withhold screening in patients who have greater mortality risk from other diseases.     We did discuss that the best way to prevent lung cancer is to not smoke.    Some patients may value a numeric estimation of lung cancer risk when evaluating if lung cancer screening is right for them, here is one calculator:    ShouldIScreen

## 2023-01-25 NOTE — NURSING NOTE
"Chief Complaint   Patient presents with     Follow up RC - Pneumonia  2 weeks ago         Initial /80 (BP Location: Right arm, Patient Position: Sitting, Cuff Size: Adult Regular)   Temp 97.7  F (36.5  C) (Temporal)   Resp 20   Ht 1.664 m (5' 5.5\")   Wt 66.1 kg (145 lb 12.8 oz)   BMI 23.89 kg/m   Estimated body mass index is 23.89 kg/m  as calculated from the following:    Height as of this encounter: 1.664 m (5' 5.5\").    Weight as of this encounter: 66.1 kg (145 lb 12.8 oz).       FOOD SECURITY SCREENING QUESTIONS:    The next two questions are to help us understand your food security.  If you are feeling you need any assistance in this area, we have resources available to support you today.    Hunger Vital Signs:  Within the past 12 months we worried whether our food would run out before we got money to buy more. Never  Within the past 12 months the food we bought just didn't last and we didn't have money to get more. Never    Advance Care Directive on file? no      Medication reconciliation complete.      Seamus Valencia,on 1/25/2023 at 10:51 AM        "

## 2023-01-25 NOTE — PATIENT INSTRUCTIONS
Glad you are feeling better!    Schedule Lung Cancer screening CT --- in Mid March 2023.     Quit Smoking - about 1 week after starting Chantix.     Use lozenges as described in clinic.       QUIT SMOKING!!     -- Choose a quit date (within 1 month). Quitting smoking abruptly is more successful than gradually cutting back.   -- Tell everyone about it (friends, family, coworkers)   -- Think about when you smoke the most, and what you'll do during those times (eg when in the car, work breaks, etc)     Consider:   -- Start varenicline (Chantix) 1 week before your quit date   -- Start bupropion (Wellbutrin/Zyban) 1 week before your quit date -- Welbutrin 1 pill daily for 1 week then 1 pill twice daily      -- Stop smoking on quit date   -- Starting with quit date, use nicotine lozenges/gum as needed for cravings     -- Quit Plan - Call them in the next 1-2 weeks to help you quit smoking.                        1-390-837-PLAN ([phone])                        http://www.[employer]   -- http://smokefree.gov/     Lung Cancer Screening   Frequently Asked Questions  If you are at high-risk for lung cancer, getting screened with low-dose computed tomography (LDCT) every year can help save your life. This handout offers answers to some of the most common questions about lung cancer screening. If you have other questions, please call [phone] ([phone]).     What is it?  Lung cancer screening uses special X-ray technology to create an image of your lung tissue. The exam is quick and easy and takes less than 10 seconds. We don t give you any medicine or use any needles. You can eat before and after the exam. You don t need to change your clothes as long as the clothing on your chest doesn t contain metal. But, you do need to be able to hold your breath for at least 6 seconds during the exam.    What is the goal of lung cancer screening?  The goal of lung cancer screening is to save lives. Many times, lung cancer is  not found until a person starts having physical symptoms. Lung cancer screening can help detect lung cancer in the earliest stages when it may be easier to treat.    Who should be screened for lung cancer?  We suggest lung cancer screening for anyone who is at high-risk for lung cancer. You are in the high-risk group if you:     are between the ages of 55 and 79, and   have smoked at least 1 pack of cigarettes a day for 20 or more years, and   still smoke or have quit within the past 15 years.    However, if you have a new cough or shortness of breath, you should talk to your doctor before being screened.    Why does it matter if I have symptoms?  Certain symptoms can be a sign that you have a condition in your lungs that should be checked and treated by your doctor. These symptoms include fever, chest pain, a new or changing cough, shortness of breath that you have never felt before, coughing up blood or unexplained weight loss. Having any of these symptoms can greatly affect the results of lung cancer screening.       Should all smokers get an LDCT lung cancer screening exam?  It depends. Lung cancer screening is for a very specific group of men and women who have a history of heavy smoking over a long period of time (see  Who should be screened for lung cancer  above).  I am in the high-risk group, but have been diagnosed with cancer in the past. Is LDCT lung cancer screening right for me?  In some cases, you should not have LDCT lung screening, such as when your doctor is already following your cancer with CT scan studies. Your doctor will help you decide if LDCT lung screening is right for you.  Do I need to have a screening exam every year?  Yes. If you are in the high-risk group described earlier, you should get an LDCT lung cancer screening exam every year until you are 79, or are no longer willing or able to undergo screening and possible procedures to diagnose and treat lung cancer.  How effective is LDCT  at preventing death from lung cancer?  Studies have shown that LDCT lung cancer screening can lower the risk of death from lung cancer by 20 percent in people who are at high-risk.  What are the risks?  There are some risks and limitations of LDCT lung cancer screening. We want to make sure you understand the risks and benefits, so please let us know if you have any questions. Your doctor may want to talk with you more about these risks.   Radiation exposure: As with any exam that uses radiation, there is a very small increased risk of cancer. The amount of radiation in LDCT is small--about the same amount a person would get from a mammogram. Your doctor orders the exam when he or she feels the potential benefits outweigh the risks.   False negatives: No test is perfect, including LDCT. It is possible that you may have a medical condition, including lung cancer, that is not found during your exam. This is called a false negative result.   False positives and more testing: LDCT very often finds something in the lung that could be cancer, but in fact is not. This is called a false positive result. False positive tests often cause anxiety. To make sure these findings are not cancer, you may need to have more tests. These tests will be done only if you give us permission. Sometimes patients need a treatment that can have side effects, such as a biopsy. For more information on false positives, see  What can I expect from the results?    Findings not related to lung cancer: Your LDCT exam also takes pictures of areas of your body next to your lungs. In a very small number of cases, the CT scan will show an abnormal finding in one of these areas, such as your kidneys, adrenal glands, liver or thyroid. This finding may not be serious, but you may need more tests. Your doctor can help you decide what other tests you may need, if any.  What can I expect from the results?  About 1 out of 4 LDCT exams will find something that  may need more tests. Most of the time, these findings are lung nodules. Lung nodules are very small collections of tissue in the lung. These nodules are very common, and the vast majority--more than 97 percent--are not cancer (benign). Most are normal lymph nodes or small areas of scarring from past infections.  But, if a small lung nodule is found to be cancer, the cancer can be cured more than 90 percent of the time. To know if the nodule is cancer, we may need to get more images before your next yearly screening exam. If the nodule has suspicious features (for example, it is large, has an odd shape or grows over time), we will refer you to a specialist for further testing.  Will my doctor also get the results?  Yes. Your doctor will get a copy of your results.  Is it okay to keep smoking now that there s a cancer screening exam?  No. Tobacco is one of the strongest cancer-causing agents. It causes not only lung cancer, but other cancers and cardiovascular (heart) diseases as well. The damage caused by smoking builds over time. This means that the longer you smoke, the higher your risk of disease. While it is never too late to quit, the sooner you quit, the better.  Where can I find help to quit smoking?  The best way to prevent lung cancer is to stop smoking. If you have already quit smoking, congratulations and keep it up! For help on quitting smoking, please call [facility] at 7-125-QUITNOW (1-886.195.9749) or the American [employer] Society at 1-109.519.6689 to find local resources near you.  One-on-one health coaching:  If you d prefer to work individually with a health care provider on tobacco cessation, we offer:     Medication Therapy Management:  Our specially trained pharmacists work closely with you and your doctor to help you quit smoking.  Call [phone] or [phone] (toll free).

## 2023-01-27 DIAGNOSIS — N52.9 IMPOTENCE OF ORGANIC ORIGIN: ICD-10-CM

## 2023-02-01 RX ORDER — TADALAFIL 20 MG/1
20 TABLET ORAL DAILY PRN
Qty: 30 TABLET | Refills: 5 | OUTPATIENT
Start: 2023-02-01

## 2023-02-01 NOTE — TELEPHONE ENCOUNTER
Refill request refused, with note to pharmacy. Should have refills on file.    Requested Prescriptions   Pending Prescriptions Disp Refills     tadalafil (CIALIS) 20 MG tablet [Pharmacy Med Name: TADALAFIL 20MG TABLET] 30 tablet 5     Sig: TAKE 1 TABLET (20 MG) BY MOUTH DAILY AS NEEDED     Last Prescription Date:   12/22/22  Last Fill Qty/Refills:         30, R-4    Shyla Giang RN on 2/1/2023 at 12:38 PM

## 2023-02-03 DIAGNOSIS — N52.9 IMPOTENCE OF ORGANIC ORIGIN: ICD-10-CM

## 2023-02-03 RX ORDER — TADALAFIL 20 MG/1
20 TABLET ORAL DAILY PRN
Qty: 30 TABLET | Refills: 4 | OUTPATIENT
Start: 2023-02-03

## 2023-02-03 NOTE — TELEPHONE ENCOUNTER
See refill encounter, dated 1/27/23.    Patient's chart was accessed to determine the status of a refill request - nothing needed at this time as the request was previously addressed.    Karly Riley RN .............. 2/3/2023  4:57 PM

## 2023-02-10 NOTE — TELEPHONE ENCOUNTER
Call center states Pt called and his Cialis prescription is not at [facility]. Per chart review, prescription never made it to its destination. Order released.    E-Prescribing Status: Receipt confirmed by pharmacy (2/10/2023 10:22 AM CST)    Karly Riley RN .............. 2/10/2023  10:38 AM

## 2023-02-14 DIAGNOSIS — N52.9 IMPOTENCE OF ORGANIC ORIGIN: ICD-10-CM

## 2023-02-14 RX ORDER — TADALAFIL 20 MG/1
20 TABLET ORAL DAILY PRN
Qty: 30 TABLET | Refills: 4 | OUTPATIENT
Start: 2023-02-14

## 2023-02-14 NOTE — TELEPHONE ENCOUNTER
Note from pharmacy: Patient needs refills please send.     Based on last fill information, please call pharmacy, to confirm receipt, as was released on date other than date of prescription.    Last prescription:  tadalafil (CIALIS) 20 MG tablet 30 tablet 4 2022  No   Sig - Route: Take 1 tablet (20 mg) by mouth daily as needed - Oral   Sent to pharmacy as: Tadalafil 20 MG Oral Tablet (CIALIS)   Class: E-Prescribe   Order: [id number]   E-Prescribing Status: Receipt confirmed by pharmacy (2/10/2023 10:22 AM CST)     [id number] ([place]) - [place], [place] - [address]     Called [facility] and spoke with Doris, after verifying Pt's last name and . Ok to disregard. Karly Rliey RN .............. 2023  4:11 PM

## 2023-04-18 NOTE — TELEPHONE ENCOUNTER
[id number]  sent Rx request for the following:        Last Office Visit:              1/24/19   Future Office visit:           none     Disp Refills Start End ADEEL   tadalafil (CIALIS) 20 MG tablet 30 tablet 5 1/24/2019  No   Sig - Route: Take 1 tablet (20 mg) by mouth daily as needed - Oral         Routing refill request to provider for review/approval because:    Erectile Dysfuction Protocol Failed2/7 2:11 PM   Recent (12 mo) or future (30 days) visit within the authorizing provider's specialty     Unable to reach patient by telephone.  Angie Mares RN .............. 2/7/2020  2:15 PM           Melolabial Transposition Flap Text: The defect edges were debeveled with a #15 scalpel blade.  Given the location of the defect and the proximity to free margins a melolabial flap was deemed most appropriate.  Using a sterile surgical marker, an appropriate melolabial transposition flap was drawn incorporating the defect.    The area thus outlined was incised deep to adipose tissue with a #15 scalpel blade.  The skin margins were undermined to an appropriate distance in all directions utilizing iris scissors.

## 2023-12-19 DIAGNOSIS — M54.16 LUMBAR BACK PAIN WITH RADICULOPATHY AFFECTING RIGHT LOWER EXTREMITY: ICD-10-CM

## 2023-12-19 DIAGNOSIS — F41.1 ANXIETY STATE: ICD-10-CM

## 2023-12-19 DIAGNOSIS — Z91.89 AT RISK FOR HEART DISEASE: ICD-10-CM

## 2023-12-26 RX ORDER — ATORVASTATIN CALCIUM 20 MG/1
TABLET, FILM COATED ORAL
Qty: 90 TABLET | Refills: 0 | Status: SHIPPED | OUTPATIENT
Start: 2023-12-26 | End: 2024-04-22

## 2023-12-26 RX ORDER — PAROXETINE 30 MG/1
TABLET, FILM COATED ORAL
Qty: 90 TABLET | Refills: 4 | Status: SHIPPED | OUTPATIENT
Start: 2023-12-26

## 2023-12-26 RX ORDER — GABAPENTIN 300 MG/1
CAPSULE ORAL
Qty: 270 CAPSULE | Refills: 0 | Status: SHIPPED | OUTPATIENT
Start: 2023-12-26 | End: 2024-04-22

## 2023-12-26 NOTE — TELEPHONE ENCOUNTER
[id number] ([facility]) of [place] sent Rx request for the following:      Requested Prescriptions   Pending Prescriptions Disp Refills    PARoxetine (PAXIL) 30 MG tablet [Pharmacy Med Name: PAROXETINE 30MG TABLET] 90 tablet 4     Sig: TAKE 1 TABLET (30MG) BY MOUTH EVERY MORNING   Last Prescription Date:   12/22/22  Last Fill Qty/Refills:         90, R-4        SSRIs Protocol Passed - 12/19/2023  1:00 AM    gabapentin (NEURONTIN) 300 MG capsule [Pharmacy Med Name: GABAPENTIN 300MG CAPSULE] 270 capsule 4     Sig: TAKE 1 CAPSULE (300MG) BY MOUTH THREE TIMES DAILY       There is no refill protocol information for this order   Last Prescription Date:   12/22/22  Last Fill Qty/Refills:         270, R-4        atorvastatin (LIPITOR) 20 MG tablet [Pharmacy Med Name: ATORVASTATIN 20MG TABLET] 90 tablet 3     Sig: TAKE 1 TABLET (20MG) BY MOUTH EVERY DAY       Statins Protocol Failed - 12/19/2023  1:00 AM        Failed - LDL on file in past 12 months     Recent Labs   Lab Test 03/03/21  1618   *     Last Prescription Date:   12/22/22  Last Fill Qty/Refills:         90, R-3         Last Office Visit:              1/25/23   Future Office visit:           none    Not all prescription requests have a protocol.   Routing to provider to review and respond.  Alanis Carter RN on 12/26/2023 at 12:07 PM

## 2024-02-24 ENCOUNTER — HEALTH MAINTENANCE LETTER (OUTPATIENT)
Age: 64
End: 2024-02-24

## 2024-04-17 DIAGNOSIS — M54.16 LUMBAR BACK PAIN WITH RADICULOPATHY AFFECTING RIGHT LOWER EXTREMITY: ICD-10-CM

## 2024-04-17 DIAGNOSIS — Z91.89 AT RISK FOR HEART DISEASE: ICD-10-CM

## 2024-04-19 NOTE — TELEPHONE ENCOUNTER
[id number] ([facility]) of [place] sent Rx request for the following:    Patient enrolled in our Rx Med Sync service to improve adherence. We are requesting a refill authorization in advance to ensure an active prescription is on file.     Requested Prescriptions   Pending Prescriptions Disp Refills    gabapentin (NEURONTIN) 300 MG capsule [Pharmacy Med Name: GABAPENTIN 300MG CAPSULE] 270 capsule 0     Sig: TAKE 1 CAPSULE (300MG) BY MOUTH THREE TIMES DAILY   Last Prescription Date:   12/26/23  Last Fill Qty/Refills:         270, R-0      There is no refill protocol information for this order       atorvastatin (LIPITOR) 20 MG tablet [Pharmacy Med Name: ATORVASTATIN 20MG TABLET] 90 tablet 0     Sig: TAKE 1 TABLET (20MG) BY MOUTH EVERY DAY   Last Prescription Date:   12/26/23  Last Fill Qty/Refills:         90, R-0      Antihyperlipidemic agents Failed - 4/19/2024  2:30 PM                     Last Office Visit:              1/25/23   Future Office visit:           None    Unable to complete prescription refill per RN Medication Refill Policy.     Karly Riley RN .............. 4/19/2024  2:32 PM

## 2024-04-22 RX ORDER — ATORVASTATIN CALCIUM 20 MG/1
TABLET, FILM COATED ORAL
Qty: 30 TABLET | Refills: 2 | Status: SHIPPED | OUTPATIENT
Start: 2024-04-22 | End: 2024-07-24

## 2024-04-22 RX ORDER — GABAPENTIN 300 MG/1
CAPSULE ORAL
Qty: 90 CAPSULE | Refills: 2 | Status: SHIPPED | OUTPATIENT
Start: 2024-04-22 | End: 2024-07-24

## 2024-07-20 DIAGNOSIS — Z91.89 AT RISK FOR HEART DISEASE: ICD-10-CM

## 2024-07-20 DIAGNOSIS — M54.16 LUMBAR BACK PAIN WITH RADICULOPATHY AFFECTING RIGHT LOWER EXTREMITY: ICD-10-CM

## 2024-07-23 NOTE — TELEPHONE ENCOUNTER
[facility] sent Rx request for the following:      Requested Prescriptions   Pending Prescriptions Disp Refills    gabapentin (NEURONTIN) 300 MG capsule [Pharmacy Med Name: GABAPENTIN 300MG CAPSULE] 90 capsule 2     Sig: TAKE 1 CAPSULE (300 MG) BY MOUTH THREE TIMES DAILY       There is no refill protocol information for this order   Last Prescription Date:   4/22/24  Last Fill Qty/Refills:         90, R-2    Last Office Visit:              12/22/22         atorvastatin (LIPITOR) 20 MG tablet [Pharmacy Med Name: ATORVASTATIN 20MG TABLET] 30 tablet 2     Sig: TAKE 1 TABLET (20 MG) BY MOUTH EVERY DAY       Antihyperlipidemic agents Failed - 7/20/2024  1:00 AM        Failed - LDL on file in the past 12 months        Failed - Recent (12 mo) or future (90 days) visit within the authorizing provider's specialty     The patient must have completed an in-person or virtual visit within the past 12 months or has a future visit scheduled within the next 90 days with the authorizing provider s specialty.  Urgent care and e-visits do not quality as an office visit for this protocol.     Last Prescription Date:   4/22/24  Last Fill Qty/Refills:         30, R-2    Last Office Visit:              12/22/22         Future Office visit:       none     Pt due for a Medicare Wellness Visit. Routing to provider for refill consideration. Routing to Unit scheduling pool, to assist Pt in scheduling appointment.          Anjelica Naranjo RN on 7/23/2024 at 11:28 AM

## 2024-07-24 RX ORDER — ATORVASTATIN CALCIUM 20 MG/1
TABLET, FILM COATED ORAL
Qty: 30 TABLET | Refills: 1 | Status: SHIPPED | OUTPATIENT
Start: 2024-07-24 | End: 2024-09-20

## 2024-07-24 RX ORDER — GABAPENTIN 300 MG/1
CAPSULE ORAL
Qty: 90 CAPSULE | Refills: 1 | Status: SHIPPED | OUTPATIENT
Start: 2024-07-24 | End: 2024-09-20

## 2024-09-18 DIAGNOSIS — Z91.89 AT RISK FOR HEART DISEASE: ICD-10-CM

## 2024-09-18 DIAGNOSIS — M54.16 LUMBAR BACK PAIN WITH RADICULOPATHY AFFECTING RIGHT LOWER EXTREMITY: ICD-10-CM

## 2024-09-20 RX ORDER — ATORVASTATIN CALCIUM 20 MG/1
TABLET, FILM COATED ORAL
Qty: 30 TABLET | Refills: 0 | Status: SHIPPED | OUTPATIENT
Start: 2024-09-20

## 2024-09-20 RX ORDER — GABAPENTIN 300 MG/1
CAPSULE ORAL
Qty: 90 CAPSULE | Refills: 0 | Status: SHIPPED | OUTPATIENT
Start: 2024-09-20

## 2024-09-20 NOTE — TELEPHONE ENCOUNTER
[id number] sent Rx request for the following:      Requested Prescriptions   Pending Prescriptions Disp Refills    atorvastatin (LIPITOR) 20 MG tablet [Pharmacy Med Name: ATORVASTATIN 20MG TABLET] 30 tablet 1     Sig: TAKE 1 TABLET (20 MG) BY MOUTH EVERY DAY       Antihyperlipidemic agents Failed - 9/20/2024  5:48 AM        Failed - LDL on file in the past 12 months        Failed - Recent (12 mo) or future (90 days) visit within the authorizing provider's specialty     The patient must have completed an in-person or virtual visit within the past 12 months or has a future visit scheduled within the next 90 days with the authorizing provider s specialty.  Urgent care and e-visits do not quality as an office visit for this protocol.          Passed - Medication is active on med list        Passed - Patient is age 18 years or older      Last Prescription Date:   7/24/24  Last Fill Qty/Refills:         30, R-1            gabapentin (NEURONTIN) 300 MG capsule [Pharmacy Med Name: GABAPENTIN 300MG CAPSULE] 90 capsule 1     Sig: TAKE 1 CAPSULE (300 MG) BY MOUTH THREE TIMES DAILY       There is no refill protocol information for this order          Last Prescription Date:   7/24/24  Last Fill Qty/Refills:         90, R-1    Last Office Visit:              12/22/22 (discussed - [facility])   Future Office visit:            None    Unable to complete prescription refill per RN Medication Refill Policy.       Isael Chandler RN on 9/20/2024 at 5:53 AM

## 2024-10-29 DIAGNOSIS — M54.16 LUMBAR BACK PAIN WITH RADICULOPATHY AFFECTING RIGHT LOWER EXTREMITY: ICD-10-CM

## 2024-10-29 DIAGNOSIS — Z91.89 AT RISK FOR HEART DISEASE: ICD-10-CM

## 2024-10-31 RX ORDER — ATORVASTATIN CALCIUM 20 MG/1
TABLET, FILM COATED ORAL
Qty: 90 TABLET | Refills: 0 | OUTPATIENT
Start: 2024-10-31

## 2024-10-31 RX ORDER — GABAPENTIN 300 MG/1
CAPSULE ORAL
Qty: 270 CAPSULE | Refills: 0 | OUTPATIENT
Start: 2024-10-31

## 2024-10-31 NOTE — TELEPHONE ENCOUNTER
[id number] ([facility]) of [place] sent Rx request for the following:      Requested Prescriptions   Pending Prescriptions Disp Refills    atorvastatin (LIPITOR) 20 MG tablet [Pharmacy Med Name: ATORVASTATIN 20MG TABLET] 30 tablet 0     Sig: TAKE 1 TABLET (20 MG) BY MOUTH EVERY DAY   Last Prescription Date:   9/20/24  Last Fill Qty/Refills:         30, R-0      Antihyperlipidemic agents Failed - 10/31/2024 10:25 AM        Failed - LDL on file in the past 12 months        Failed - Recent (12 mo) or future (90 days) visit within the authorizing provider's specialty       gabapentin (NEURONTIN) 300 MG capsule [Pharmacy Med Name: GABAPENTIN 300MG CAPSULE] 90 capsule 0     Sig: TAKE 1 CAPSULE (300 MG) BY MOUTH THREE TIMES DAILY   Last Prescription Date:   9/20/24  Last Fill Qty/Refills:         90, R-0      There is no refill protocol information for this order        Last Office Visit:              1/25/23   Future Office visit:           None    Pt due for annual exam. Routing to provider for refill consideration. Routing to Unit scheduling pool, to assist Pt in scheduling appointment. Unable to complete prescription refill per RN Medication Refill Policy. Karly Riley RN .............. 10/31/2024  10:26 AM

## 2024-10-31 NOTE — TELEPHONE ENCOUNTER
Notified patient to schedule an appointment for refills. Maricel Giang LPN .......................10/31/2024  1:17 PM

## 2024-11-07 RX ORDER — GABAPENTIN 300 MG/1
300 CAPSULE ORAL 3 TIMES DAILY
Qty: 90 CAPSULE | Refills: 0 | Status: SHIPPED | OUTPATIENT
Start: 2024-11-07

## 2024-11-07 RX ORDER — ATORVASTATIN CALCIUM 20 MG/1
20 TABLET, FILM COATED ORAL DAILY
Qty: 30 TABLET | Refills: 0 | Status: SHIPPED | OUTPATIENT
Start: 2024-11-07

## 2024-11-26 DIAGNOSIS — Z91.89 AT RISK FOR HEART DISEASE: ICD-10-CM

## 2024-11-27 ENCOUNTER — OFFICE VISIT (OUTPATIENT)
Dept: FAMILY MEDICINE | Facility: OTHER | Age: 64
End: 2024-11-27
Attending: FAMILY MEDICINE
Payer: COMMERCIAL

## 2024-11-27 VITALS
HEIGHT: 66 IN | RESPIRATION RATE: 16 BRPM | WEIGHT: 146.8 LBS | OXYGEN SATURATION: 99 % | DIASTOLIC BLOOD PRESSURE: 82 MMHG | TEMPERATURE: 97.5 F | SYSTOLIC BLOOD PRESSURE: 122 MMHG | BODY MASS INDEX: 23.59 KG/M2 | HEART RATE: 70 BPM

## 2024-11-27 DIAGNOSIS — Z12.11 COLON CANCER SCREENING: ICD-10-CM

## 2024-11-27 DIAGNOSIS — J18.9 MULTIFOCAL PNEUMONIA: ICD-10-CM

## 2024-11-27 DIAGNOSIS — Z12.5 PROSTATE CANCER SCREENING: ICD-10-CM

## 2024-11-27 DIAGNOSIS — Z91.89 AT RISK FOR HEART DISEASE: ICD-10-CM

## 2024-11-27 DIAGNOSIS — N52.9 IMPOTENCE OF ORGANIC ORIGIN: ICD-10-CM

## 2024-11-27 DIAGNOSIS — F41.1 ANXIETY STATE: ICD-10-CM

## 2024-11-27 DIAGNOSIS — Z00.00 ENCOUNTER FOR MEDICARE ANNUAL WELLNESS EXAM: Primary | ICD-10-CM

## 2024-11-27 DIAGNOSIS — M54.16 LUMBAR BACK PAIN WITH RADICULOPATHY AFFECTING RIGHT LOWER EXTREMITY: ICD-10-CM

## 2024-11-27 LAB
ALBUMIN SERPL BCG-MCNC: 4.2 G/DL (ref 3.5–5.2)
ALP SERPL-CCNC: 93 U/L (ref 40–150)
ALT SERPL W P-5'-P-CCNC: 19 U/L (ref 0–70)
ANION GAP SERPL CALCULATED.3IONS-SCNC: 6 MMOL/L (ref 7–15)
AST SERPL W P-5'-P-CCNC: 26 U/L (ref 0–45)
BASOPHILS # BLD AUTO: 0 10E3/UL (ref 0–0.2)
BASOPHILS NFR BLD AUTO: 1 %
BILIRUB SERPL-MCNC: 0.2 MG/DL
BUN SERPL-MCNC: 14 MG/DL (ref 8–23)
CALCIUM SERPL-MCNC: 9.4 MG/DL (ref 8.8–10.4)
CHLORIDE SERPL-SCNC: 106 MMOL/L (ref 98–107)
CHOLEST SERPL-MCNC: 230 MG/DL
CREAT SERPL-MCNC: 0.83 MG/DL (ref 0.67–1.17)
EGFRCR SERPLBLD CKD-EPI 2021: >90 ML/MIN/1.73M2
EOSINOPHIL # BLD AUTO: 0.1 10E3/UL (ref 0–0.7)
EOSINOPHIL NFR BLD AUTO: 2 %
ERYTHROCYTE [DISTWIDTH] IN BLOOD BY AUTOMATED COUNT: 13 % (ref 10–15)
FASTING STATUS PATIENT QL REPORTED: YES
FASTING STATUS PATIENT QL REPORTED: YES
GLUCOSE SERPL-MCNC: 108 MG/DL (ref 70–99)
HCO3 SERPL-SCNC: 28 MMOL/L (ref 22–29)
HCT VFR BLD AUTO: 48 % (ref 40–53)
HDLC SERPL-MCNC: 54 MG/DL
HGB BLD-MCNC: 16.1 G/DL (ref 13.3–17.7)
IMM GRANULOCYTES # BLD: 0 10E3/UL
IMM GRANULOCYTES NFR BLD: 0 %
LDLC SERPL CALC-MCNC: 160 MG/DL
LYMPHOCYTES # BLD AUTO: 2.3 10E3/UL (ref 0.8–5.3)
LYMPHOCYTES NFR BLD AUTO: 28 %
MCH RBC QN AUTO: 31 PG (ref 26.5–33)
MCHC RBC AUTO-ENTMCNC: 33.5 G/DL (ref 31.5–36.5)
MCV RBC AUTO: 92 FL (ref 78–100)
MONOCYTES # BLD AUTO: 0.5 10E3/UL (ref 0–1.3)
MONOCYTES NFR BLD AUTO: 6 %
NEUTROPHILS # BLD AUTO: 5.1 10E3/UL (ref 1.6–8.3)
NEUTROPHILS NFR BLD AUTO: 63 %
NONHDLC SERPL-MCNC: 176 MG/DL
NRBC # BLD AUTO: 0 10E3/UL
NRBC BLD AUTO-RTO: 0 /100
PLATELET # BLD AUTO: 362 10E3/UL (ref 150–450)
POTASSIUM SERPL-SCNC: 4.6 MMOL/L (ref 3.4–5.3)
PROT SERPL-MCNC: 6.9 G/DL (ref 6.4–8.3)
PSA SERPL DL<=0.01 NG/ML-MCNC: 0.78 NG/ML (ref 0–4.5)
RBC # BLD AUTO: 5.2 10E6/UL (ref 4.4–5.9)
SODIUM SERPL-SCNC: 140 MMOL/L (ref 135–145)
TRIGL SERPL-MCNC: 78 MG/DL
WBC # BLD AUTO: 8.1 10E3/UL (ref 4–11)

## 2024-11-27 PROCEDURE — 36415 COLL VENOUS BLD VENIPUNCTURE: CPT | Mod: ZL | Performed by: FAMILY MEDICINE

## 2024-11-27 PROCEDURE — 80061 LIPID PANEL: CPT | Mod: ZL | Performed by: FAMILY MEDICINE

## 2024-11-27 PROCEDURE — G0103 PSA SCREENING: HCPCS | Mod: ZL | Performed by: FAMILY MEDICINE

## 2024-11-27 PROCEDURE — 85004 AUTOMATED DIFF WBC COUNT: CPT | Mod: ZL | Performed by: FAMILY MEDICINE

## 2024-11-27 PROCEDURE — 80053 COMPREHEN METABOLIC PANEL: CPT | Mod: ZL | Performed by: FAMILY MEDICINE

## 2024-11-27 RX ORDER — GABAPENTIN 300 MG/1
300 CAPSULE ORAL 3 TIMES DAILY
Qty: 90 CAPSULE | Refills: 12 | Status: SHIPPED | OUTPATIENT
Start: 2024-11-27

## 2024-11-27 RX ORDER — TADALAFIL 20 MG/1
20 TABLET ORAL DAILY PRN
Qty: 30 TABLET | Refills: 4 | Status: SHIPPED | OUTPATIENT
Start: 2024-11-27

## 2024-11-27 RX ORDER — ATORVASTATIN CALCIUM 20 MG/1
20 TABLET, FILM COATED ORAL DAILY
Qty: 30 TABLET | Refills: 0 | OUTPATIENT
Start: 2024-11-27

## 2024-11-27 RX ORDER — ATORVASTATIN CALCIUM 20 MG/1
20 TABLET, FILM COATED ORAL DAILY
Qty: 90 TABLET | Refills: 4 | Status: SHIPPED | OUTPATIENT
Start: 2024-11-27

## 2024-11-27 RX ORDER — PAROXETINE 30 MG/1
30 TABLET, FILM COATED ORAL EVERY MORNING
Qty: 90 TABLET | Refills: 4 | Status: SHIPPED | OUTPATIENT
Start: 2024-11-27

## 2024-11-27 SDOH — HEALTH STABILITY: PHYSICAL HEALTH: ON AVERAGE, HOW MANY DAYS PER WEEK DO YOU ENGAGE IN MODERATE TO STRENUOUS EXERCISE (LIKE A BRISK WALK)?: 3 DAYS

## 2024-11-27 ASSESSMENT — SOCIAL DETERMINANTS OF HEALTH (SDOH): HOW OFTEN DO YOU GET TOGETHER WITH FRIENDS OR RELATIVES?: ONCE A WEEK

## 2024-11-27 ASSESSMENT — PAIN SCALES - GENERAL: PAINLEVEL_OUTOF10: NO PAIN (0)

## 2024-11-27 NOTE — NURSING NOTE
"Chief Complaint   Patient presents with    Wellness Visit     Annual Wellness        Initial /82 (BP Location: Right arm, Patient Position: Chair, Cuff Size: Adult Large)   Pulse 70   Temp 97.5  F (36.4  C) (Tympanic)   Resp 16   Ht 1.664 m (5' 5.5\")   Wt 66.6 kg (146 lb 12.8 oz)   SpO2 99%   BMI 24.06 kg/m   Estimated body mass index is 24.06 kg/m  as calculated from the following:    Height as of this encounter: 1.664 m (5' 5.5\").    Weight as of this encounter: 66.6 kg (146 lb 12.8 oz).      Medication Reconciliation: Complete.       Christine Galindo LPN on 11/27/2024 at 8:01 AM     "

## 2024-11-27 NOTE — PROGRESS NOTES
Preventive Care Visit  [facility]  David Carballo MD, Family Medicine  Nov 27, 2024  {Provider  Link to [facility] :[id number]}    {PROVIDER CHARTING PREFERENCE:673239}    Antolin Appiah is a 63 year old, presenting for the following:  Wellness Visit (Annual Wellness )        11/27/2024     7:57 AM   Additional Questions   Roomed by Christine MURILLO LPN   Accompanied by N/A          HPI  ***  {MA/LPN/RN Pre-Provider Visit Orders- hCG/UA/Strep (Optional):016367}  {SUPERLIST (Optional):591027}  {additonal problems for provider to add (Optional):941467}  Health Care Directive  Patient does not have a Health Care Directive: Discussed advance care planning with patient; however, patient declined at this time.      11/27/2024   General Health   How would you rate your overall physical health? Good   Feel stress (tense, anxious, or unable to sleep) Not at all            11/27/2024   Nutrition   Diet: Regular (no restrictions)            11/27/2024   Exercise   Days per week of moderate/strenous exercise 3 days            11/27/2024   Social Factors   Frequency of gathering with friends or relatives Once a week   Worry food won't last until get money to buy more No   Food not last or not have enough money for food? No   Do you have housing? (Housing is defined as stable permanent housing and does not include staying ouside in a car, in a tent, in an abandoned building, in an overnight shelter, or couch-surfing.) Yes   Are you worried about losing your housing? No   Lack of transportation? No   Unable to get utilities (heat,electricity)? No            11/27/2024   Fall Risk   Fallen 2 or more times in the past year? No    Trouble with walking or balance? No        Patient-reported          11/27/2024   Activities of Daily Living- Home Safety   Needs help with the following daily activites None of the above   Safety concerns in the home None of the above            11/27/2024   Dental   Dentist two times  every year? (!) NO            11/27/2024   Hearing Screening   Hearing concerns? None of the above            11/27/2024   Driving Risk Screening   Patient/family members have concerns about driving No            11/27/2024   General Alertness/Fatigue Screening   Have you been more tired than usual lately? No            11/27/2024   Urinary Incontinence Screening   Bothered by leaking urine in past 6 months No            11/27/2024   TB Screening   Were you born outside of the [place]? No            Today's PHQ-2 Score:       11/27/2024     7:48 AM   PHQ-2 ( 1999 Pf[employer])   Q1: Little interest or pleasure in doing things 0    Q2: Feeling down, depressed or hopeless 0    PHQ-2 Score 0    Q1: Little interest or pleasure in doing things Not at all   Q2: Feeling down, depressed or hopeless Not at all   PHQ-2 Score 0       Patient-reported           11/27/2024   Substance Use   Alcohol more than 3/day or more than 7/wk No   Do you have a current opioid prescription? No   How severe/bad is pain from 1 to 10? 0/10 (No Pain)   Do you use any other substances recreationally? No        Social History     Tobacco Use    Smoking status: Every Day     Current packs/day: 1.00     Average packs/day: 1 pack/day for 44.0 years (44.0 ttl pk-yrs)     Types: Cigarettes     Passive exposure: Current    Smokeless tobacco: Never   Vaping Use    Vaping status: Never Used   Substance Use Topics    Alcohol use: Yes     Comment: Alcoholic Drinks/day: less than weekly    Drug use: Never     {Provider  If there are gaps in the social history shown above, please follow the link to update and then refresh the note Link to Social and Substance History :891756}      11/27/2024   AAA Screening   Family history of Abdominal Aortic Aneurysm (AAA)? No      Last PSA:   Prostate Specific Antigen Screen   Date Value Ref Range Status   12/22/2022 0.76 0.00 - 4.50 ng/mL Final     ASCVD Risk   The 10-year ASCVD risk score (Brad ARCE, et al., 2019) is:  16.3%    Values used to calculate the score:      Age: 63 years      Sex: Male      Is Non- : No      Diabetic: No      Tobacco smoker: Yes      Systolic Blood Pressure: 122 mmHg      Is BP treated: No      HDL Cholesterol: 49 mg/dL      Total Cholesterol: 199 mg/dL    {Link to Fracture Risk Assessment Tool (Optional):680057}    {Provider  REQUIRED FOR AWV Use the storyboard to review patient history, after sections have been marked as reviewed, refresh note to capture documentation:756417}  {Provider   REQUIRED AWV use this link to review and update sexual activity history  after section has been marked as reviewed, refresh note to capture documentation:737537}  Reviewed and updated as needed this visit by Provider                    {HISTORY OPTIONS (Optional):089318}  Current providers sharing in care for this patient include:  Patient Care Team:  David Carballo MD as PCP - General (Family Practice)  David Carballo MD as Assigned PCP    The following health maintenance items are reviewed in Epic and correct as of today:  Health Maintenance   Topic Date Due    ADVANCE CARE PLANNING  Never done    COLORECTAL CANCER SCREENING  Never done    ZOSTER IMMUNIZATION (1 of 2) Never done    LUNG CANCER SCREENING  Never done    LIPID  03/03/2022    MEDICARE ANNUAL WELLNESS VISIT  12/22/2023    FALL RISK ASSESSMENT  11/27/2025    GLUCOSE  12/22/2025    DTAP/TDAP/TD IMMUNIZATION (3 - Td or Tdap) 12/22/2032    RSV VACCINE (1 - 1-dose 75+ series) 12/15/2033    PHQ-2 (once per calendar year)  Completed    INFLUENZA VACCINE  Completed    Pneumococcal Vaccine: 65+ Years  Completed    COVID-19 Vaccine  Completed    HPV IMMUNIZATION  Aged Out    MENINGITIS IMMUNIZATION  Aged Out    RSV MONOCLONAL ANTIBODY  Aged Out    HEPATITIS C SCREENING  Discontinued    HIV SCREENING  Discontinued       {ROS Picklists (Optional):127681}     Objective    Exam  /82 (BP Location: Right arm, Patient Position:  "Chair, Cuff Size: Adult Large)   Pulse 70   Temp 97.5  F (36.4  C) (Tympanic)   Resp 16   Ht 1.664 m (5' 5.5\")   Wt 66.6 kg (146 lb 12.8 oz)   SpO2 99%   BMI 24.06 kg/m     Estimated body mass index is 24.06 kg/m  as calculated from the following:    Height as of this encounter: 1.664 m (5' 5.5\").    Weight as of this encounter: 66.6 kg (146 lb 12.8 oz).    Physical Exam  {Exam Choices (Optional):837716}        11/27/2024   Mini Cog   Clock Draw Score 2 Normal   3 Item Recall 2 objects recalled   Mini Cog Total Score 4        {A Mini-Cog total score of 0-2 suggests the possibility of dementia, score of 3-5 suggests no dementia:884485}        Signed Electronically by: David Carballo MD  {Email feedback regarding this note to [email or web address]   :347585}  "

## 2024-11-27 NOTE — PROGRESS NOTES
Preventive Care Visit  [facility]  David Carballo MD, Family Medicine  Nov 27, 2024      Assessment & Plan     Encounter for Medicare annual wellness exam    - Lipid Panel; Future  - Comprehensive Metabolic Panel; Future  - Lipid Panel  - Comprehensive Metabolic Panel    Impotence of organic origin    - tadalafil (CIALIS) 20 MG tablet; Take 1 tablet (20 mg) by mouth daily as needed.    Anxiety state    - PARoxetine (PAXIL) 30 MG tablet; Take 1 tablet (30 mg) by mouth every morning.  - CBC and Differential; Future  - CBC and Differential    Lumbar back pain with radiculopathy affecting right lower extremity    - gabapentin (NEURONTIN) 300 MG capsule; Take 1 capsule (300 mg) by mouth 3 times daily.    At risk for heart disease    - atorvastatin (LIPITOR) 20 MG tablet; Take 1 tablet (20 mg) by mouth daily.    Multifocal pneumonia      Colon cancer screening    - Colonoscopy Screening  Referral; Future    Prostate cancer screening    - PSA Screen GH; Future  - PSA Screen GH    Results for orders placed or performed in visit on 11/27/24   CBC with platelets and differential     Status: None   Result Value Ref Range    WBC Count 8.1 4.0 - 11.0 10e3/uL    RBC Count 5.20 4.40 - 5.90 10e6/uL    Hemoglobin 16.1 13.3 - 17.7 g/dL    Hematocrit 48.0 40.0 - 53.0 %    MCV 92 78 - 100 fL    MCH 31.0 26.5 - 33.0 pg    MCHC 33.5 31.5 - 36.5 g/dL    RDW 13.0 10.0 - 15.0 %    Platelet Count 362 150 - 450 10e3/uL    % Neutrophils 63 %    % Lymphocytes 28 %    % Monocytes 6 %    % Eosinophils 2 %    % Basophils 1 %    % Immature Granulocytes 0 %    NRBCs per 100 WBC 0 <1 /100    Absolute Neutrophils 5.1 1.6 - 8.3 10e3/uL    Absolute Lymphocytes 2.3 0.8 - 5.3 10e3/uL    Absolute Monocytes 0.5 0.0 - 1.3 10e3/uL    Absolute Eosinophils 0.1 0.0 - 0.7 10e3/uL    Absolute Basophils 0.0 0.0 - 0.2 10e3/uL    Absolute Immature Granulocytes 0.0 <=0.4 10e3/uL    Absolute NRBCs 0.0 10e3/uL   CBC and Differential      Status: None    Narrative    The following orders were created for panel order CBC and Differential.  Procedure                               Abnormality         Status                     ---------                               -----------         ------                     CBC with platelets and d...[[id number]]                      Final result                 Please view results for these tests on the individual orders.     Labs so far satisfactory        Nicotine/Tobacco Cessation  He reports that he has been smoking cigarettes. He has a 44 pack-year smoking history. He has been exposed to tobacco smoke. He has never used smokeless tobacco.  Nicotine/Tobacco Cessation Plan  Information offered: Patient not interested at this time      Counseling  Appropriate preventive services were addressed with this patient via screening, questionnaire, or discussion as appropriate for fall prevention, nutrition, physical activity, Tobacco-use cessation, social engagement, weight loss and cognition.  Checklist reviewing preventive services available has been given to the patient.  Reviewed patient's diet, addressing concerns and/or questions.   He is at risk for lack of exercise and has been provided with information to increase physical activity for the benefit of his well-being.   The patient was instructed to see the dentist every 6 months.           No follow-ups on file.    Antolin Appiah is a 63 year old, presenting for the following:  Wellness Visit (Welcome  To Medicare/Annual Wellness )        11/27/2024     7:57 AM   Additional Questions   Roomed by Christine MURILLO LPN   Accompanied by N/A          APRYL  Patient arrives here for wellness.  Medication refill and labs.  Currently he is doing well.  Declines all immunization.  He is interested in a colonoscopy.        Health Care Directive  Patient does not have a Health Care Directive: Discussed advance care planning with patient; however, patient declined at this  time.      11/27/2024   General Health   How would you rate your overall physical health? Good   Feel stress (tense, anxious, or unable to sleep) Not at all            11/27/2024   Nutrition   Diet: Regular (no restrictions)            11/27/2024   Exercise   Days per week of moderate/strenous exercise 3 days            11/27/2024   Social Factors   Frequency of gathering with friends or relatives Once a week   Worry food won't last until get money to buy more No   Food not last or not have enough money for food? No   Do you have housing? (Housing is defined as stable permanent housing and does not include staying ouside in a car, in a tent, in an abandoned building, in an overnight shelter, or couch-surfing.) Yes   Are you worried about losing your housing? No   Lack of transportation? No   Unable to get utilities (heat,electricity)? No            11/27/2024   Fall Risk   Fallen 2 or more times in the past year? No    Trouble with walking or balance? No        Patient-reported          11/27/2024   Activities of Daily Living- Home Safety   Needs help with the following daily activites None of the above   Safety concerns in the home None of the above            11/27/2024   Dental   Dentist two times every year? (!) NO            11/27/2024   Hearing Screening   Hearing concerns? None of the above            11/27/2024   Driving Risk Screening   Patient/family members have concerns about driving No            11/27/2024   General Alertness/Fatigue Screening   Have you been more tired than usual lately? No            11/27/2024   Urinary Incontinence Screening   Bothered by leaking urine in past 6 months No            11/27/2024   TB Screening   Were you born outside of the [place]? No            Today's PHQ-2 Score:       11/27/2024     7:48 AM   PHQ-2 ( 1999 Pfizer)   Q1: Little interest or pleasure in doing things 0    Q2: Feeling down, depressed or hopeless 0    PHQ-2 Score 0    Q1: Little interest or pleasure in  doing things Not at all   Q2: Feeling down, depressed or hopeless Not at all   PHQ-2 Score 0       Patient-reported           11/27/2024   Substance Use   Alcohol more than 3/day or more than 7/wk No   Do you have a current opioid prescription? No   How severe/bad is pain from 1 to 10? 0/10 (No Pain)   Do you use any other substances recreationally? No        Social History     Tobacco Use    Smoking status: Every Day     Current packs/day: 1.00     Average packs/day: 1 pack/day for 44.0 years (44.0 ttl pk-yrs)     Types: Cigarettes     Passive exposure: Current    Smokeless tobacco: Never   Vaping Use    Vaping status: Never Used   Substance Use Topics    Alcohol use: Yes     Comment: Alcoholic Drinks/day: less than weekly    Drug use: Never           11/27/2024   AAA Screening   Family history of Abdominal Aortic Aneurysm (AAA)? No      Last PSA:   Prostate Specific Antigen Screen   Date Value Ref Range Status   12/22/2022 0.76 0.00 - 4.50 ng/mL Final     ASCVD Risk   The 10-year ASCVD risk score (Brad ARCE, et al., 2019) is: 16.3%    Values used to calculate the score:      Age: 63 years      Sex: Male      Is Non- : No      Diabetic: No      Tobacco smoker: Yes      Systolic Blood Pressure: 122 mmHg      Is BP treated: No      HDL Cholesterol: 49 mg/dL      Total Cholesterol: 199 mg/dL            Reviewed and updated as needed this visit by Provider                      Current providers sharing in care for this patient include:  Patient Care Team:  David Carballo MD as PCP - General (Family Practice)  David Carballo MD as Assigned PCP    The following health maintenance items are reviewed in Epic and correct as of today:  Health Maintenance   Topic Date Due    ADVANCE CARE PLANNING  Never done    COLORECTAL CANCER SCREENING  Never done    ZOSTER IMMUNIZATION (1 of 2) Never done    LUNG CANCER SCREENING  Never done    LIPID  03/03/2022    AORTIC ANEURYSM SCREENING (SYSTEM  "ASSIGNED)  Never done    NICOTINE/TOBACCO CESSATION COUNSELING Q 1 YR  12/22/2023    MEDICARE ANNUAL WELLNESS VISIT  12/22/2023    FALL RISK ASSESSMENT  11/27/2025    GLUCOSE  12/22/2025    DTAP/TDAP/TD IMMUNIZATION (3 - Td or Tdap) 12/22/2032    RSV VACCINE (1 - 1-dose 75+ series) 12/15/2033    PHQ-2 (once per calendar year)  Completed    INFLUENZA VACCINE  Completed    Pneumococcal Vaccine: 65+ Years  Completed    COVID-19 Vaccine  Completed    HPV IMMUNIZATION  Aged Out    MENINGITIS IMMUNIZATION  Aged Out    RSV MONOCLONAL ANTIBODY  Aged Out    HEPATITIS C SCREENING  Discontinued    HIV SCREENING  Discontinued            Objective    Exam  /82 (BP Location: Right arm, Patient Position: Chair, Cuff Size: Adult Large)   Pulse 70   Temp 97.5  F (36.4  C) (Tympanic)   Resp 16   Ht 1.664 m (5' 5.5\")   Wt 66.6 kg (146 lb 12.8 oz)   SpO2 99%   BMI 24.06 kg/m     Estimated body mass index is 24.06 kg/m  as calculated from the following:    Height as of this encounter: 1.664 m (5' 5.5\").    Weight as of this encounter: 66.6 kg (146 lb 12.8 oz).    Physical Exam  GENERAL: alert and no distress  NECK: no adenopathy, no asymmetry, masses, or scars  RESP: lungs clear to auscultation - no rales, rhonchi or wheezes  CV: regular rate and rhythm, normal S1 S2, no S3 or S4, no murmur, click or rub, no peripheral edema  ABDOMEN: soft, nontender, no hepatosplenomegaly, no masses and bowel sounds normal  MS: no gross musculoskeletal defects noted, no edema  Recent eye test done at the [facility].  Hearing normal.  Large seborrhea keratosis on his right scalp.      11/27/2024   Mini Cog   Clock Draw Score 2 Normal   3 Item Recall 2 objects recalled   Mini Cog Total Score 4                Signed Electronically by: David Carballo MD    "

## 2024-12-10 DIAGNOSIS — Z12.11 ENCOUNTER FOR SCREENING COLONOSCOPY: Primary | ICD-10-CM

## 2024-12-10 NOTE — TELEPHONE ENCOUNTER
Screening Questions for the Scheduling of Screening Colonoscopies   (If Colonoscopy is diagnostic, Provider should review the chart before scheduling.)  Are you younger than 45 or older than 80?  NO  Do you take aspirin or fish oil?  NO (if yes, tell patient to stop 1 week prior to Colonoscopy)  Do you take warfarin (Coumadin), clopidogrel (Plavix), apixaban (Eliquis), dabigatram (Pradaxa), rivaroxaban (Xarelto) or any blood thinner? NO  Do you take semaglutide (Ozempic or Wegovy), tirzepatide (Mounjaro or Zepbound), liraglutide (Victoza), or dulaglutide (Trulicity)? NO  Do you use oxygen or a CPAP at home?  NO  Do you have kidney disease? NO  Are you on dialysis? NO  Have you had a stroke or heart attack in the last year? NO  Have you had a stent in your heart or any blood vessel in the last year? NO  Have you had a transplant of any organ? NO  Have you had a colonoscopy or upper endoscopy (EGD) before? YES         When?  2015  Date of scheduled Colonoscopy. 03/03/2025  Provider [facility] [place]

## 2024-12-11 RX ORDER — POLYETHYLENE GLYCOL 3350, SODIUM CHLORIDE, SODIUM BICARBONATE, POTASSIUM CHLORIDE 420; 11.2; 5.72; 1.48 G/4L; G/4L; G/4L; G/4L
4000 POWDER, FOR SOLUTION ORAL ONCE
Qty: 4000 ML | Refills: 0 | Status: SHIPPED | OUTPATIENT
Start: 2025-02-24 | End: 2025-02-24

## 2024-12-11 RX ORDER — BISACODYL 5 MG/1
TABLET, DELAYED RELEASE ORAL
Qty: 2 TABLET | Refills: 0 | Status: SHIPPED | OUTPATIENT
Start: 2025-02-24

## 2025-02-26 PROBLEM — Z00.00 PHYSICAL EXAM: Status: RESOLVED | Noted: 2017-07-13 | Resolved: 2025-02-26

## 2025-02-26 PROBLEM — Z98.890 H/O COLONOSCOPY: Status: RESOLVED | Noted: 2017-07-13 | Resolved: 2025-02-26

## 2025-02-26 PROBLEM — Z00.00 HEALTHCARE MAINTENANCE: Status: ACTIVE | Noted: 2025-02-26

## 2025-03-03 ENCOUNTER — HOSPITAL ENCOUNTER (OUTPATIENT)
Facility: OTHER | Age: 65
Discharge: HOME | End: 2025-03-03
Attending: SURGERY | Admitting: SURGERY
Payer: COMMERCIAL

## 2025-03-03 ENCOUNTER — ANESTHESIA EVENT (OUTPATIENT)
Dept: SURGERY | Facility: OTHER | Age: 65
End: 2025-03-03
Payer: COMMERCIAL

## 2025-03-03 ENCOUNTER — ANESTHESIA (OUTPATIENT)
Dept: SURGERY | Facility: OTHER | Age: 65
End: 2025-03-03
Payer: COMMERCIAL

## 2025-03-03 VITALS
RESPIRATION RATE: 12 BRPM | TEMPERATURE: 96.9 F | SYSTOLIC BLOOD PRESSURE: 116 MMHG | OXYGEN SATURATION: 96 % | DIASTOLIC BLOOD PRESSURE: 82 MMHG | HEART RATE: 58 BPM | WEIGHT: 140 LBS | BODY MASS INDEX: 22.94 KG/M2

## 2025-03-03 PROBLEM — K63.5 COLON POLYPS: Status: ACTIVE | Noted: 2025-03-03

## 2025-03-03 PROBLEM — K57.30 SIGMOID DIVERTICULOSIS: Status: ACTIVE | Noted: 2025-03-03

## 2025-03-03 PROCEDURE — 250N000011 HC RX IP 250 OP 636

## 2025-03-03 PROCEDURE — 88305 TISSUE EXAM BY PATHOLOGIST: CPT

## 2025-03-03 PROCEDURE — 45385 COLONOSCOPY W/LESION REMOVAL: CPT | Mod: PT | Performed by: SURGERY

## 2025-03-03 PROCEDURE — 45380 COLONOSCOPY AND BIOPSY: CPT | Mod: PT | Performed by: SURGERY

## 2025-03-03 PROCEDURE — 45380 COLONOSCOPY AND BIOPSY: CPT | Mod: PT,XU

## 2025-03-03 PROCEDURE — 250N000009 HC RX 250

## 2025-03-03 PROCEDURE — 45385 COLONOSCOPY W/LESION REMOVAL: CPT

## 2025-03-03 PROCEDURE — 45378 DIAGNOSTIC COLONOSCOPY: CPT | Performed by: SURGERY

## 2025-03-03 PROCEDURE — 999N000010 HC STATISTIC ANES STAT CODE-CRNA PER MINUTE: Performed by: SURGERY

## 2025-03-03 PROCEDURE — 258N000003 HC RX IP 258 OP 636: Performed by: SURGERY

## 2025-03-03 RX ORDER — LIDOCAINE 40 MG/G
CREAM TOPICAL
Status: DISCONTINUED | OUTPATIENT
Start: 2025-03-03 | End: 2025-03-03 | Stop reason: HOSPADM

## 2025-03-03 RX ORDER — FLUMAZENIL 0.1 MG/ML
0.2 INJECTION, SOLUTION INTRAVENOUS
Status: CANCELLED | OUTPATIENT
Start: 2025-03-03 | End: 2025-03-04

## 2025-03-03 RX ORDER — NALOXONE HYDROCHLORIDE 0.4 MG/ML
0.4 INJECTION, SOLUTION INTRAMUSCULAR; INTRAVENOUS; SUBCUTANEOUS
Status: CANCELLED | OUTPATIENT
Start: 2025-03-03

## 2025-03-03 RX ORDER — SODIUM CHLORIDE, SODIUM LACTATE, POTASSIUM CHLORIDE, CALCIUM CHLORIDE 600; 310; 30; 20 MG/100ML; MG/100ML; MG/100ML; MG/100ML
INJECTION, SOLUTION INTRAVENOUS CONTINUOUS
Status: DISCONTINUED | OUTPATIENT
Start: 2025-03-03 | End: 2025-03-03 | Stop reason: HOSPADM

## 2025-03-03 RX ORDER — ONDANSETRON 2 MG/ML
4 INJECTION INTRAMUSCULAR; INTRAVENOUS
Status: DISCONTINUED | OUTPATIENT
Start: 2025-03-03 | End: 2025-03-03 | Stop reason: HOSPADM

## 2025-03-03 RX ORDER — LIDOCAINE HYDROCHLORIDE 20 MG/ML
INJECTION, SOLUTION INFILTRATION; PERINEURAL PRN
Status: DISCONTINUED | OUTPATIENT
Start: 2025-03-03 | End: 2025-03-03

## 2025-03-03 RX ORDER — NALOXONE HYDROCHLORIDE 0.4 MG/ML
0.2 INJECTION, SOLUTION INTRAMUSCULAR; INTRAVENOUS; SUBCUTANEOUS
Status: CANCELLED | OUTPATIENT
Start: 2025-03-03

## 2025-03-03 RX ORDER — PROPOFOL 10 MG/ML
INJECTION, EMULSION INTRAVENOUS PRN
Status: DISCONTINUED | OUTPATIENT
Start: 2025-03-03 | End: 2025-03-03

## 2025-03-03 RX ORDER — SODIUM CHLORIDE, SODIUM LACTATE, POTASSIUM CHLORIDE, CALCIUM CHLORIDE 600; 310; 30; 20 MG/100ML; MG/100ML; MG/100ML; MG/100ML
INJECTION, SOLUTION INTRAVENOUS CONTINUOUS
Status: CANCELLED | OUTPATIENT
Start: 2025-03-03

## 2025-03-03 RX ORDER — POLYETHYLENE GLYCOL 3350, SODIUM CHLORIDE, SODIUM BICARBONATE, POTASSIUM CHLORIDE 420; 11.2; 5.72; 1.48 G/4L; G/4L; G/4L; G/4L
4000 POWDER, FOR SOLUTION ORAL
Status: ON HOLD | COMMUNITY
Start: 2025-02-24 | End: 2025-03-03

## 2025-03-03 RX ORDER — PROPOFOL 10 MG/ML
INJECTION, EMULSION INTRAVENOUS CONTINUOUS PRN
Status: DISCONTINUED | OUTPATIENT
Start: 2025-03-03 | End: 2025-03-03

## 2025-03-03 RX ADMIN — PROPOFOL 140 MCG/KG/MIN: 10 INJECTION, EMULSION INTRAVENOUS at 11:29

## 2025-03-03 RX ADMIN — LIDOCAINE HYDROCHLORIDE 60 MG: 20 INJECTION, SOLUTION INFILTRATION; PERINEURAL at 11:29

## 2025-03-03 RX ADMIN — SODIUM CHLORIDE, SODIUM LACTATE, POTASSIUM CHLORIDE, AND CALCIUM CHLORIDE: .6; .31; .03; .02 INJECTION, SOLUTION INTRAVENOUS at 10:26

## 2025-03-03 RX ADMIN — PROPOFOL 50 MG: 10 INJECTION, EMULSION INTRAVENOUS at 11:29

## 2025-03-03 ASSESSMENT — ACTIVITIES OF DAILY LIVING (ADL)
ADLS_ACUITY_SCORE: 41

## 2025-03-03 ASSESSMENT — LIFESTYLE VARIABLES: TOBACCO_USE: 1

## 2025-03-03 NOTE — OR NURSING
Patient has been discharged to home at 1305 via ambulatory accompanied by Doris HU    Written discharge instructions were provided to patient.  Prescriptions were none.  Patient states their pain is none, and denies any nausea or dizziness upon discharge.    Patient and adult caring for them verbalize understanding of discharge instructions including no driving until tomorrow and no longer taking narcotic pain medications - no operating mechanical equipment and no making any important decisions.They understand reason for discharge, and necessary follow-up appointments.

## 2025-03-03 NOTE — H&P
History and Physical    CHIEF COMPLAINT / REASON FOR PROCEDURE:  Healthcare maintenance     PERTINENT HISTORY   Patient is a 64 year old male who presents today for screening colonoscopy for Healthcare maintenance .   Last colonoscopy over 10 years ago.  Patient has no complaints.    Past Medical History:   Diagnosis Date    Strain of muscle, fascia and tendon of lower back, initial encounter     No Comments Provided     Past Surgical History:   Procedure Laterality Date    TONSILLECTOMY      No Comments Provided    VASECTOMY      2004       Bleeding tendencies:  No    ALLERGIES/SENSITIVITIES: No Known Allergies     CURRENT MEDICATIONS:    Prior to Admission medications    Medication Sig Start Date End Date Taking? Authorizing Provider   Ascorbic Acid (VITAMIN C GUMMIES PO) Take 2 Gum by mouth daily.   Yes Reported, Patient   atorvastatin (LIPITOR) 20 MG tablet Take 1 tablet (20 mg) by mouth daily. 11/27/24  Yes David Carballo MD   gabapentin (NEURONTIN) 300 MG capsule Take 1 capsule (300 mg) by mouth 3 times daily. 11/27/24  Yes David Carballo MD   PARoxetine (PAXIL) 30 MG tablet Take 1 tablet (30 mg) by mouth every morning. 11/27/24  Yes David Carballo MD   tadalafil (CIALIS) 20 MG tablet Take 1 tablet (20 mg) by mouth daily as needed. 11/27/24   David Carballo MD       Physical Exam:  /84   Temp 96.9  F (36.1  C) (Tympanic)   Resp 16   Wt 63.5 kg (140 lb)   SpO2 99%   BMI 22.94 kg/m    EXAM:  Chest/Respiratory Exam: Normal - Clear to auscultation without rales, rhonchi, or wheezing.  Cardiovascular Exam: regular rate and rhythm        PLAN: COLONOSCOPY .  Patient understands risks of bleeding, perforation, potential inability to reach cecum, aspiration and wishes to proceed.

## 2025-03-03 NOTE — ANESTHESIA CARE TRANSFER NOTE
Patient: Td Leonardo    Procedure: Procedure(s):  Colonoscopy WITH POLYPECTOMY AND TATTOOING AND CLIP       Diagnosis: Colon cancer screening [Z12.11]  Diagnosis Additional Information: No value filed.    Anesthesia Type:   MAC     Note:    Oropharynx: oropharynx clear of all foreign objects and spontaneously breathing  Level of Consciousness: awake  Oxygen Supplementation: face mask  Level of Supplemental Oxygen (L/min / FiO2): 6  Independent Airway: airway patency satisfactory and stable  Dentition: dentition unchanged  Vital Signs Stable: post-procedure vital signs reviewed and stable  Report to RN Given: handoff report given  Patient transferred to: Phase II    Handoff Report: Identifed the Patient, Identified the Reponsible Provider, Reviewed the pertinent medical history, Discussed the surgical course, Reviewed Intra-OP anesthesia mangement and issues during anesthesia, Set expectations for post-procedure period and Allowed opportunity for questions and acknowledgement of understanding    Vitals:  Vitals Value Taken Time   BP     Temp     Pulse     Resp     SpO2 96 % 03/03/25 1225   Vitals shown include unfiled device data.    Electronically Signed By: KATERINE Choi CRNA  March 3, 2025  12:25 PM

## 2025-03-03 NOTE — DISCHARGE INSTRUCTIONS
[facility]  Adult Discharge Orders & Instructions      For 24 hours after surgery:  Get plenty of rest.  A responsible adult must stay with you for at least 24 hours after you leave the hospital.   You may feel lightheaded.  IF so, sit for a few minutes before standing.  Have someone help you get up.   You may have a slight fever. Call the doctor if your fever is over 101 F (38.3 C) (taken under the tongue) or lasts longer than 24 hours.  You may have a dry mouth, a sore throat, muscle aches or trouble sleeping.  These should go away after 24 hours.  Do not make important or legal decisions.  6.   Do not drive or use heavy equipment.  If you have weakness or tingling, don't drive or use heavy equipment until this feeling goes away.                                                                                                                                                                         To contact a doctor, call    [phone]______________

## 2025-03-03 NOTE — ANESTHESIA POSTPROCEDURE EVALUATION
Patient: Td Leonardo    Procedure: Procedure(s):  Colonoscopy WITH POLYPECTOMY AND TATTOOING AND CLIP       Anesthesia Type:  MAC    Note:  Disposition: Outpatient   Postop Pain Control: Uneventful            Sign Out: Well controlled pain   PONV: No   Neuro/Psych: Uneventful            Sign Out: Acceptable/Baseline neuro status   Airway/Respiratory: Uneventful            Sign Out: Acceptable/Baseline resp. status   CV/Hemodynamics: Uneventful            Sign Out: Acceptable CV status; No obvious hypovolemia; No obvious fluid overload   Other NRE: NONE   DID A NON-ROUTINE EVENT OCCUR?            Last vitals:  Vitals Value Taken Time   /82 03/03/25 1300   Temp 96.9  F (36.1  C) 03/03/25 1225   Pulse 58 03/03/25 1300   Resp 12 03/03/25 1225   SpO2 95 % 03/03/25 1301   Vitals shown include unfiled device data.    Electronically Signed By: KATERINE SCHERER CRNA  March 3, 2025  1:47 PM

## 2025-03-03 NOTE — OP NOTE
PROCEDURE NOTE    DATE OF SERVICE: 3/3/2025    SURGEON: Jordon Farley MD    PRE-OP DIAGNOSIS:    Healthcare maintenance   Screening      POST-OP DIAGNOSIS:  Same  Sigmoid Diverticulosis  Polyps at HF, mid TC, SF, 20 cm and rectum    PROCEDURE:     Colonoscopy with snare polypectomies and cold biopsy    ANESTHESIA:  DERICK Calderon CRNA    INDICATION FOR THE PROCEDURE: The patient is a 64 year old male in need of Healthcare maintenance  . The patient has no other complaints  . After explaining the risks to include bleeding, perforation, potential inability toreach the cecum, the patient wished to proceed.    PROCEDURE:After adequate sedation, the patient was in the left lateral decubitus position.  Rectal exam was performed.  There was normal tone and no palpable masses .  The colonoscope was introduced into the rectum and advanced to the cecum with Mild difficulty.  The patient's prep was good.  The terminal cecum was reached.  The cecum, ascending, transverse, descending and sigmoid colon was with sigmoid diverticulosis. At HF two flat 1 cm polyps were completely removed by snare. At mid TC a 1 cm polyp was completely removed by snare. The first and third polypectomy sites were clipped. All three were inked. At SF two smaller 0.5 cm polyp were removed by snare and Jumbo forceps and a diminutive polyp at rectum was removed with Jumbo forceps .  The scope was retroflexed in the rectum.  The rectum was otherwise unremarkable  .  The scope was straightened and removed.  The patient tolerated the procedure well.     ESTIMATED BLOOD LOSS: none    COMPLICATIONS:  None    TISSUE REMOVED:  Yes    RECOMMEND:      Follow-up pending pathology  Fiber  Given literature on diverticulosis    Jordon Farley MD FACS

## 2025-03-03 NOTE — ANESTHESIA PREPROCEDURE EVALUATION
Anesthesia Pre-Procedure Evaluation    Patient: Td Leonardo   MRN: [id number] : 1960        Procedure : Procedure(s):  Colonoscopy          Past Medical History:   Diagnosis Date    Strain of muscle, fascia and tendon of lower back, initial encounter     No Comments Provided      Past Surgical History:   Procedure Laterality Date    TONSILLECTOMY      No Comments Provided    VASECTOMY            No Known Allergies   Social History     Tobacco Use    Smoking status: Every Day     Current packs/day: 0.75     Average packs/day: 1 pack/day for 49.2 years (47.9 ttl pk-yrs)     Types: Cigarettes     Start date:      Passive exposure: Current    Smokeless tobacco: Never   Substance Use Topics    Alcohol use: Yes     Comment: Alcoholic Drinks/day: less than weekly      Wt Readings from Last 1 Encounters:   25 63.5 kg (140 lb)        Anesthesia Evaluation   Pt has had prior anesthetic.         ROS/MED HX  ENT/Pulmonary:     (+)                tobacco use, Current use,                       Neurologic:  - neg neurologic ROS     Cardiovascular:  - neg cardiovascular ROS   (+) Dyslipidemia - -   -  - -                                      METS/Exercise Tolerance: >4 METS    Hematologic:  - neg hematologic  ROS     Musculoskeletal:  - neg musculoskeletal ROS     GI/Hepatic:     (+)        bowel prep,            Renal/Genitourinary:  - neg Renal ROS     Endo:  - neg endo ROS     Psychiatric/Substance Use:  - neg psychiatric ROS     Infectious Disease:  - neg infectious disease ROS     Malignancy:  - neg malignancy ROS     Other:            Physical Exam    Airway        Mallampati: I   TM distance: > 3 FB   Neck ROM: full   Mouth opening: > 3 cm    Respiratory Devices and Support         Dental       (+) Multiple visibly decayed, broken teeth      Cardiovascular   cardiovascular exam normal          Pulmonary           (+) decreased breath sounds           OUTSIDE LABS:  CBC:   Lab Results   Component  "Value Date    WBC 8.1 11/27/2024    WBC 8.6 12/22/2022    HGB 16.1 11/27/2024    HGB 16.1 12/22/2022    HCT 48.0 11/27/2024    HCT 46.8 12/22/2022     11/27/2024     12/22/2022     BMP:   Lab Results   Component Value Date     11/27/2024     12/22/2022    POTASSIUM 4.6 11/27/2024    POTASSIUM 4.0 12/22/2022    CHLORIDE 106 11/27/2024    CHLORIDE 103 12/22/2022    CO2 28 11/27/2024    CO2 28 12/22/2022    BUN 14.0 11/27/2024    BUN 9.6 12/22/2022    CR 0.83 11/27/2024    CR 0.71 12/22/2022     (H) 11/27/2024     (H) 12/22/2022     COAGS: No results found for: \"PTT\", \"INR\", \"FIBR\"  POC: No results found for: \"BGM\", \"HCG\", \"HCGS\"  HEPATIC:   Lab Results   Component Value Date    ALBUMIN 4.2 11/27/2024    PROTTOTAL 6.9 11/27/2024    ALT 19 11/27/2024    AST 26 11/27/2024    ALKPHOS 93 11/27/2024    BILITOTAL 0.2 11/27/2024     OTHER:   Lab Results   Component Value Date    JOSS 9.4 11/27/2024       Anesthesia Plan    ASA Status:  2    NPO Status:  NPO Appropriate    Anesthesia Type: MAC.              Consents    Anesthesia Plan(s) and associated risks, benefits, and realistic alternatives discussed. Questions answered and patient/representative(s) expressed understanding.     - Discussed: Risks, Benefits and Alternatives for BOTH SEDATION and the PROCEDURE were discussed     - Discussed with:  Patient, Spouse            Postoperative Care            Comments:               KATERINE SCHERER CRNA    I have reviewed the pertinent notes and labs in the chart from the past 30 days and (re)examined the patient.  Any updates or changes from those notes are reflected in this note.    Clinically Significant Risk Factors Present on Admission                                          "

## 2025-03-05 LAB
PATH REPORT.COMMENTS IMP SPEC: NORMAL
PATH REPORT.FINAL DX SPEC: NORMAL
PATH REPORT.RELEVANT HX SPEC: NORMAL
PHOTO IMAGE: NORMAL

## 2025-03-07 PROBLEM — Z86.0101 H/O ADENOMATOUS POLYP OF COLON: Status: ACTIVE | Noted: 2025-03-03

## 2025-03-07 PROBLEM — Z00.00 HEALTHCARE MAINTENANCE: Status: RESOLVED | Noted: 2025-02-26 | Resolved: 2025-03-07

## 2025-07-22 PROBLEM — D12.6 ADENOMATOUS POLYP OF COLON: Status: ACTIVE | Noted: 2025-07-22

## 2025-07-23 ENCOUNTER — PATIENT OUTREACH (OUTPATIENT)
Dept: GASTROENTEROLOGY | Facility: CLINIC | Age: 65
End: 2025-07-23
Payer: COMMERCIAL

## (undated) DEVICE — SOL WATER 1500ML

## (undated) DEVICE — ENDO BRUSH CHANNEL MASTER CLEANING 2-4.2MM BW-412T

## (undated) DEVICE — TUBING SUCTION 10'X3/16" N510

## (undated) DEVICE — ENDOSCOPIC MARKER

## (undated) DEVICE — SUCTION MANIFOLD NEPTUNE 2 SYS 4 PORT 0702-020-000

## (undated) DEVICE — ENDO SNARE POLYPECTOMY CRESENT 20MM LOOP SD-221U-25

## (undated) DEVICE — ESU GROUND PAD ADULT W/CORD E7507

## (undated) DEVICE — ENDO TRAP POLYP E-TRAP 00711099

## (undated) DEVICE — ENDO KIT COMPLIANCE DYKENDOCMPLY

## (undated) DEVICE — Device

## (undated) DEVICE — HEMOCLIP QUICKCLIP PRO OLYMPUS 230CM HX-202UR.B

## (undated) DEVICE — FORCEP BIOPSY RADIAL JAW 4 BRONCH M00515180

## (undated) RX ORDER — PROPOFOL 10 MG/ML
INJECTION, EMULSION INTRAVENOUS
Status: DISPENSED
Start: 2025-03-03